# Patient Record
Sex: FEMALE | Race: WHITE | Employment: UNEMPLOYED | URBAN - METROPOLITAN AREA
[De-identification: names, ages, dates, MRNs, and addresses within clinical notes are randomized per-mention and may not be internally consistent; named-entity substitution may affect disease eponyms.]

---

## 2017-01-25 ENCOUNTER — TELEPHONE (OUTPATIENT)
Dept: FAMILY MEDICINE CLINIC | Facility: CLINIC | Age: 57
End: 2017-01-25

## 2017-01-25 NOTE — TELEPHONE ENCOUNTER
Patient will be seeing Derm/ Dr Brenda Guerra she would like recent  lab results faxed to [phone].   Faxed

## 2017-05-17 ENCOUNTER — OFFICE VISIT (OUTPATIENT)
Dept: FAMILY MEDICINE CLINIC | Facility: CLINIC | Age: 57
End: 2017-05-17

## 2017-05-17 VITALS
RESPIRATION RATE: 16 BRPM | HEIGHT: 63.5 IN | DIASTOLIC BLOOD PRESSURE: 74 MMHG | TEMPERATURE: 98 F | HEART RATE: 58 BPM | BODY MASS INDEX: 22.92 KG/M2 | WEIGHT: 131 LBS | OXYGEN SATURATION: 97 % | SYSTOLIC BLOOD PRESSURE: 108 MMHG

## 2017-05-17 DIAGNOSIS — Z00.00 WELL ADULT EXAM: Primary | ICD-10-CM

## 2017-05-17 DIAGNOSIS — Z13.0 SCREENING FOR DEFICIENCY ANEMIA: ICD-10-CM

## 2017-05-17 DIAGNOSIS — Z13.220 SCREENING, LIPID: ICD-10-CM

## 2017-05-17 DIAGNOSIS — Z13.29 SCREENING FOR THYROID DISORDER: ICD-10-CM

## 2017-05-17 DIAGNOSIS — Z13.1 SCREENING FOR DIABETES MELLITUS: ICD-10-CM

## 2017-05-17 DIAGNOSIS — Z12.31 ENCOUNTER FOR SCREENING MAMMOGRAM FOR BREAST CANCER: ICD-10-CM

## 2017-05-17 PROCEDURE — 99396 PREV VISIT EST AGE 40-64: CPT | Performed by: FAMILY MEDICINE

## 2017-05-17 RX ORDER — VALACYCLOVIR HYDROCHLORIDE 1 G/1
TABLET, FILM COATED ORAL
Refills: 7 | COMMUNITY
Start: 2017-04-04 | End: 2017-05-17

## 2017-05-17 RX ORDER — VALACYCLOVIR HYDROCHLORIDE 1 G/1
2 TABLET, FILM COATED ORAL EVERY 12 HOURS SCHEDULED
Qty: 12 TABLET | Refills: 1 | Status: SHIPPED | OUTPATIENT
Start: 2017-05-17 | End: 2021-08-18

## 2017-05-17 NOTE — PROGRESS NOTES
Da Bagley is a 61year old female here for Patient presents with:  Physical: annual Px  last pap  5/13/15-neg HPV-neg      HPI:   Patient complains of here for well exam.     Mild sniffles. Bunions, not painful.      Annual skin checks     Me Dementia Father      Alzheimer's   • Heart Attack Father    • Cancer Mother      basal cell   • Heart Attack Paternal Grandfather    • Diabetes Paternal Grandmother    • Hypertension Maternal Grandmother    • Asthma Son    • Cancer Other      colorectal ca exertion, wheezing, hemoptysis. Cardiovascular: No heart palpitations, irregular heartbeat, faintness or syncope, no chest pressure or chest pain on exertion. No edema or varicose veins.   GI: No change in appetite, heartburn, diarrhea, constipation, or bl reviewed.     [facility] Lab Encounter on 06/02/2016  Glucose Date: 06/02/2016   Value: 86  Ref Range 70-99 mg/dL Status: Final   BUN Date: 06/02/2016   Value: 21* Ref Range 8-20 mg/dL Status: Final   Creatinine Date: 06/02/2016   Value: 0.86  Ref Range 0.55-1.02 35-45 mg/dL  Moderate CHD risk    45-60 mg/dL  Average CHD risk    60-75 md/dL  Below average CHD risk       LDL Cholesterol Date: 06/02/2016   Value: 128  Ref Range <130 mg/dL Status: Final   VLDL Date: 06/02/2016   Value: 17  Ref Range 5-40 mg/dL Status: 06/02/2016   Value: 36.2* Ref Range 37.0-54.0 % Status: Final   PLT Date: 06/02/2016   Value: 350.0  Ref Range 150.0-450.0 10(3)uL Status: Final   MCV Date: 06/02/2016   Value: 97.6  Ref Range 81.0-100.0 fL Status: Final   MCH Date: 06/02/2016   Value: 30. lipid  -     Lipid; Future    Screening for thyroid disorder  -     TSH W Reflex To Free T4; Future    Other orders  -     ValACYclovir HCl 1 G Oral Tab; Take 2 tablets (2,000 mg total) by mouth every 12 (twelve) hours.        Patient Instructions   Conside

## 2017-05-17 NOTE — PATIENT INSTRUCTIONS
Consider Zostavax (shingles vaccine) over age 61. Check with insurance on coverage. Mammogram recommended every 1-2 years after age 36. Pap next year. Labs ordered: lipid profile, comprehensive metabolic profile, CBC, TSH.  We recommend exercise 150 minutes

## 2017-05-19 ENCOUNTER — LAB ENCOUNTER (OUTPATIENT)
Dept: LAB | Age: 57
End: 2017-05-19
Attending: FAMILY MEDICINE
Payer: COMMERCIAL

## 2017-05-19 DIAGNOSIS — Z13.1 SCREENING FOR DIABETES MELLITUS: ICD-10-CM

## 2017-05-19 DIAGNOSIS — Z13.220 SCREENING, LIPID: ICD-10-CM

## 2017-05-19 DIAGNOSIS — Z13.0 SCREENING FOR DEFICIENCY ANEMIA: ICD-10-CM

## 2017-05-19 DIAGNOSIS — Z13.29 SCREENING FOR THYROID DISORDER: ICD-10-CM

## 2017-05-19 PROCEDURE — 36415 COLL VENOUS BLD VENIPUNCTURE: CPT | Performed by: FAMILY MEDICINE

## 2017-05-19 PROCEDURE — 80061 LIPID PANEL: CPT | Performed by: FAMILY MEDICINE

## 2017-05-19 PROCEDURE — 80050 GENERAL HEALTH PANEL: CPT | Performed by: FAMILY MEDICINE

## 2017-05-22 ENCOUNTER — PATIENT MESSAGE (OUTPATIENT)
Dept: FAMILY MEDICINE CLINIC | Facility: CLINIC | Age: 57
End: 2017-05-22

## 2017-05-22 DIAGNOSIS — R53.82 CHRONIC FATIGUE: Primary | ICD-10-CM

## 2017-05-22 DIAGNOSIS — R79.89 ABNORMAL TSH: ICD-10-CM

## 2017-05-22 DIAGNOSIS — R63.5 WEIGHT GAIN: ICD-10-CM

## 2017-05-22 NOTE — TELEPHONE ENCOUNTER
From: Johanny Worley  To: Doreen Mendez MD  Sent: 5/22/2017 2:36 PM CDT  Subject: Test Results Question    I have been having difficulty with slight weight gain that is hard to lose over the past 6 months.  Also increase in fatigue come late aft

## 2017-05-26 ENCOUNTER — APPOINTMENT (OUTPATIENT)
Dept: LAB | Age: 57
End: 2017-05-26
Attending: FAMILY MEDICINE
Payer: COMMERCIAL

## 2017-05-26 DIAGNOSIS — R63.5 WEIGHT GAIN: ICD-10-CM

## 2017-05-26 DIAGNOSIS — R53.82 CHRONIC FATIGUE: ICD-10-CM

## 2017-05-26 DIAGNOSIS — R79.89 ABNORMAL TSH: ICD-10-CM

## 2017-05-26 PROCEDURE — 84481 FREE ASSAY (FT-3): CPT | Performed by: FAMILY MEDICINE

## 2017-05-26 PROCEDURE — 86800 THYROGLOBULIN ANTIBODY: CPT | Performed by: FAMILY MEDICINE

## 2017-05-26 PROCEDURE — 86376 MICROSOMAL ANTIBODY EACH: CPT | Performed by: FAMILY MEDICINE

## 2017-05-26 PROCEDURE — 36415 COLL VENOUS BLD VENIPUNCTURE: CPT | Performed by: FAMILY MEDICINE

## 2017-05-26 PROCEDURE — 84439 ASSAY OF FREE THYROXINE: CPT | Performed by: FAMILY MEDICINE

## 2017-05-26 PROCEDURE — 84443 ASSAY THYROID STIM HORMONE: CPT | Performed by: FAMILY MEDICINE

## 2017-06-14 ENCOUNTER — HOSPITAL ENCOUNTER (OUTPATIENT)
Dept: MAMMOGRAPHY | Age: 57
Discharge: HOME | End: 2017-06-14
Attending: FAMILY MEDICINE
Payer: COMMERCIAL

## 2017-06-14 DIAGNOSIS — Z12.31 ENCOUNTER FOR SCREENING MAMMOGRAM FOR BREAST CANCER: ICD-10-CM

## 2017-06-14 PROCEDURE — 77067 SCR MAMMO BI INCL CAD: CPT | Performed by: FAMILY MEDICINE

## 2018-03-13 ENCOUNTER — HOSPITAL ENCOUNTER (OUTPATIENT)
Dept: CT IMAGING | Facility: HOSPITAL | Age: 58
Discharge: HOME | End: 2018-03-13
Attending: FAMILY MEDICINE

## 2018-03-13 DIAGNOSIS — Z13.9 VISIT FOR SCREENING: ICD-10-CM

## 2018-03-19 ENCOUNTER — PATIENT MESSAGE (OUTPATIENT)
Dept: FAMILY MEDICINE CLINIC | Facility: CLINIC | Age: 58
End: 2018-03-19

## 2018-03-19 NOTE — TELEPHONE ENCOUNTER
Viewed by Keith Nettles on 3/16/2018  1:00 PM   Written by Leslye Kumari MD on 3/13/2018 12:46 PM   In reviewing the CT scan that was done for your heart, the radiologist noted a small 3 mm noncalcified right middle lobe nodule.  There's also a

## 2018-03-19 NOTE — TELEPHONE ENCOUNTER
From: Lor Worley  To: Magan Haynes MD  Sent: 3/19/2018 12:10 PM CDT  Subject: Test Results Question    Should I be worried? I have to admit this is a bit concerning.

## 2018-05-23 ENCOUNTER — OFFICE VISIT (OUTPATIENT)
Dept: FAMILY MEDICINE CLINIC | Facility: CLINIC | Age: 58
End: 2018-05-23

## 2018-05-23 VITALS
BODY MASS INDEX: 23.07 KG/M2 | DIASTOLIC BLOOD PRESSURE: 68 MMHG | SYSTOLIC BLOOD PRESSURE: 110 MMHG | OXYGEN SATURATION: 99 % | HEIGHT: 63.25 IN | WEIGHT: 131.81 LBS | HEART RATE: 68 BPM | TEMPERATURE: 98 F | RESPIRATION RATE: 16 BRPM

## 2018-05-23 DIAGNOSIS — Z13.220 SCREENING, LIPID: ICD-10-CM

## 2018-05-23 DIAGNOSIS — Z13.0 SCREENING FOR DEFICIENCY ANEMIA: Primary | ICD-10-CM

## 2018-05-23 DIAGNOSIS — Z11.59 ENCOUNTER FOR HEPATITIS C SCREENING TEST FOR LOW RISK PATIENT: ICD-10-CM

## 2018-05-23 DIAGNOSIS — Z13.29 SCREENING FOR THYROID DISORDER: ICD-10-CM

## 2018-05-23 DIAGNOSIS — Z12.31 ENCOUNTER FOR SCREENING MAMMOGRAM FOR BREAST CANCER: ICD-10-CM

## 2018-05-23 DIAGNOSIS — Z13.1 SCREENING FOR DIABETES MELLITUS: ICD-10-CM

## 2018-05-23 DIAGNOSIS — Z01.419 WELL WOMAN EXAM WITH ROUTINE GYNECOLOGICAL EXAM: ICD-10-CM

## 2018-05-23 PROCEDURE — 88175 CYTOPATH C/V AUTO FLUID REDO: CPT | Performed by: FAMILY MEDICINE

## 2018-05-23 PROCEDURE — 87624 HPV HI-RISK TYP POOLED RSLT: CPT | Performed by: FAMILY MEDICINE

## 2018-05-23 PROCEDURE — 99396 PREV VISIT EST AGE 40-64: CPT | Performed by: FAMILY MEDICINE

## 2018-05-23 NOTE — PROGRESS NOTES
Sebas Wiggins is a 61year old female here for Patient presents with: Well Adult: Physical w/ pap       HPI:   Patient complains of here for well exam.     Feet are bothersome. Doesn't want bunion surgery yet. golfs, walks.      Taking melatonin es Vitamins-Minerals (MULTIVITAMIN OR) Take  by mouth. Disp:  Rfl:    ValACYclovir HCl 1 G Oral Tab Take 2 tablets (2,000 mg total) by mouth every 12 (twelve) hours.  Disp: 12 tablet Rfl: 1       Allergy:    Pcn [Penicillins]       HIVES, SWELLING    Family Hi unusual bleeding or bruising, no lymph node enlargement or tenderness. Endocrine: No thyroid symptoms. No symptoms of diabetes. Respiratory: No cough, shortness of breath, dyspnea on exertion, wheezing.   Cardiovascular: No heart palpitations, irregular h normal,PAP was done. Bimanual: No cervical motion tenderness. Uterus anteverted, somewhat atrophic. Adnexae nontender, no masses. MUSCULOSKELETAL: Back and extremities within normal limits  EXTREMITIES: no cyanosis, clubbing or edema.  Peripheral pulses n oz hard liquor) daily on average. We also recommend against illegal drug use, or misuse of prescription drugs. Help and information is available for harmful habits or addictions.  Advance directives: Consider obtaining living will or health care power of at

## 2018-05-23 NOTE — PATIENT INSTRUCTIONS
Consider medication or counseling for anxiety/depression/elder care stress. Tdap (tetanus diphtheria pertussis) booster recommended if not received in previous 10 years. Due next year.  Consider shingles vaccine, Shing-stephane 2 shots 2-6 month apart, over a

## 2018-05-24 ENCOUNTER — LAB ENCOUNTER (OUTPATIENT)
Dept: LAB | Age: 58
End: 2018-05-24
Attending: FAMILY MEDICINE
Payer: COMMERCIAL

## 2018-05-24 DIAGNOSIS — Z11.59 ENCOUNTER FOR HEPATITIS C SCREENING TEST FOR LOW RISK PATIENT: ICD-10-CM

## 2018-05-24 DIAGNOSIS — Z13.29 SCREENING FOR THYROID DISORDER: ICD-10-CM

## 2018-05-24 DIAGNOSIS — Z13.220 SCREENING, LIPID: ICD-10-CM

## 2018-05-24 DIAGNOSIS — Z13.1 SCREENING FOR DIABETES MELLITUS: ICD-10-CM

## 2018-05-24 DIAGNOSIS — Z13.0 SCREENING FOR DEFICIENCY ANEMIA: ICD-10-CM

## 2018-05-24 PROCEDURE — 86803 HEPATITIS C AB TEST: CPT | Performed by: FAMILY MEDICINE

## 2018-05-24 PROCEDURE — 80061 LIPID PANEL: CPT | Performed by: FAMILY MEDICINE

## 2018-05-24 PROCEDURE — 36415 COLL VENOUS BLD VENIPUNCTURE: CPT | Performed by: FAMILY MEDICINE

## 2018-05-24 PROCEDURE — 80050 GENERAL HEALTH PANEL: CPT | Performed by: FAMILY MEDICINE

## 2018-07-16 ENCOUNTER — HOSPITAL ENCOUNTER (OUTPATIENT)
Dept: MAMMOGRAPHY | Age: 58
Discharge: HOME | End: 2018-07-16
Attending: FAMILY MEDICINE
Payer: COMMERCIAL

## 2018-07-16 DIAGNOSIS — Z12.31 ENCOUNTER FOR SCREENING MAMMOGRAM FOR BREAST CANCER: ICD-10-CM

## 2018-07-16 PROCEDURE — 77063 BREAST TOMOSYNTHESIS BI: CPT | Performed by: FAMILY MEDICINE

## 2018-07-16 PROCEDURE — 77067 SCR MAMMO BI INCL CAD: CPT | Performed by: FAMILY MEDICINE

## 2019-05-28 ENCOUNTER — OFFICE VISIT (OUTPATIENT)
Dept: FAMILY MEDICINE CLINIC | Facility: CLINIC | Age: 59
End: 2019-05-28
Payer: COMMERCIAL

## 2019-05-28 VITALS
HEIGHT: 63.25 IN | SYSTOLIC BLOOD PRESSURE: 102 MMHG | BODY MASS INDEX: 22.92 KG/M2 | TEMPERATURE: 99 F | DIASTOLIC BLOOD PRESSURE: 76 MMHG | HEART RATE: 101 BPM | OXYGEN SATURATION: 98 % | WEIGHT: 131 LBS | RESPIRATION RATE: 18 BRPM

## 2019-05-28 DIAGNOSIS — Z00.00 WELL ADULT EXAM: ICD-10-CM

## 2019-05-28 DIAGNOSIS — Z13.0 SCREENING FOR DEFICIENCY ANEMIA: ICD-10-CM

## 2019-05-28 DIAGNOSIS — Z13.220 SCREENING, LIPID: ICD-10-CM

## 2019-05-28 DIAGNOSIS — Z13.1 SCREENING FOR DIABETES MELLITUS: ICD-10-CM

## 2019-05-28 DIAGNOSIS — Z12.31 ENCOUNTER FOR SCREENING MAMMOGRAM FOR BREAST CANCER: Primary | ICD-10-CM

## 2019-05-28 PROCEDURE — 99396 PREV VISIT EST AGE 40-64: CPT | Performed by: FAMILY MEDICINE

## 2019-05-28 NOTE — PATIENT INSTRUCTIONS
Shingrix when able to get at pharmacy. Mammogram in July and labs. Colonoscopy and pap in 2021. Otherwise up to date on screening. BMI is normal, don't need to lose weight.      Recheck in a year on physical.    Schedule for Tdap when you are ab

## 2019-05-28 NOTE — PROGRESS NOTES
Avni Patton is a 60year old female here for Patient presents with:  Physical      HPI:   Patient complains of here for well exam.     Generally feels well. Mother had CVA last year, plans hospice.  Pt has HCPOA, in senior community in memory (2,000 mg total) by mouth every 12 (twelve) hours.  Disp: 12 tablet Rfl: 1       Allergy:    Pcn [Penicillins]       HIVES, SWELLING    Family History   Problem Relation Age of Onset   • Dementia Father         Alzheimer's   • Heart Attack Father    • Neuro abused: Not on file        Forced sexual activity: Not on file    Other Topics      Concerns:         Service: Not Asked        Blood Transfusions: No        Caffeine Concern: Yes          2 c coffee/day        Occupational Exposure: No        Hobb tenderness. :defer  MUSCULOSKELETAL: Back and extremities within normal limits  EXTREMITIES: no cyanosis, clubbing or edema.  Peripheral pulses normal.  NEURO: Nonfocal exam. Oriented times three,cranial nerves are intact,motor and sensory are grossly in Average CHD risk    60-75 md/dL  Below average CHD risk       • LDL Cholesterol 05/24/2018 146* <130 mg/dL Final   • VLDL 05/24/2018 18  5 - 40 mg/dL Final   • Chol/HDL Ratio 05/24/2018 4.64* <4.44 Final      Interpretation of CHOL/HDL ratios:      Male: 05/24/2018 5.9  % Final   • Eosinophil % 05/24/2018 2.5  % Final   • Basophil % 05/24/2018 0.4  % Final   • Immature Granulocyte % 05/24/2018 0.2  % Final           ASSESSMENT AND PLAN:   [employer] was seen today for physical.    Diagnoses and all orders for t

## 2019-06-27 ENCOUNTER — LAB ENCOUNTER (OUTPATIENT)
Dept: LAB | Age: 59
End: 2019-06-27
Attending: FAMILY MEDICINE
Payer: COMMERCIAL

## 2019-06-27 DIAGNOSIS — Z13.0 SCREENING FOR DEFICIENCY ANEMIA: ICD-10-CM

## 2019-06-27 DIAGNOSIS — Z13.220 SCREENING, LIPID: ICD-10-CM

## 2019-06-27 DIAGNOSIS — Z13.1 SCREENING FOR DIABETES MELLITUS: ICD-10-CM

## 2019-06-27 PROCEDURE — 80061 LIPID PANEL: CPT | Performed by: FAMILY MEDICINE

## 2019-06-27 PROCEDURE — 36415 COLL VENOUS BLD VENIPUNCTURE: CPT | Performed by: FAMILY MEDICINE

## 2019-06-27 PROCEDURE — 83036 HEMOGLOBIN GLYCOSYLATED A1C: CPT | Performed by: FAMILY MEDICINE

## 2019-06-27 PROCEDURE — 85025 COMPLETE CBC W/AUTO DIFF WBC: CPT | Performed by: FAMILY MEDICINE

## 2019-06-27 PROCEDURE — 80053 COMPREHEN METABOLIC PANEL: CPT | Performed by: FAMILY MEDICINE

## 2019-07-17 ENCOUNTER — NURSE ONLY (OUTPATIENT)
Dept: FAMILY MEDICINE CLINIC | Facility: CLINIC | Age: 59
End: 2019-07-17
Payer: COMMERCIAL

## 2019-07-17 PROCEDURE — 90471 IMMUNIZATION ADMIN: CPT | Performed by: FAMILY MEDICINE

## 2019-07-17 PROCEDURE — 90715 TDAP VACCINE 7 YRS/> IM: CPT | Performed by: FAMILY MEDICINE

## 2019-07-19 ENCOUNTER — HOSPITAL ENCOUNTER (OUTPATIENT)
Dept: MAMMOGRAPHY | Age: 59
Discharge: HOME | End: 2019-07-19
Attending: FAMILY MEDICINE
Payer: COMMERCIAL

## 2019-07-19 DIAGNOSIS — Z12.31 ENCOUNTER FOR SCREENING MAMMOGRAM FOR BREAST CANCER: ICD-10-CM

## 2019-07-19 PROCEDURE — 77067 SCR MAMMO BI INCL CAD: CPT | Performed by: FAMILY MEDICINE

## 2019-07-19 PROCEDURE — 77063 BREAST TOMOSYNTHESIS BI: CPT | Performed by: FAMILY MEDICINE

## 2019-12-06 ENCOUNTER — PATIENT MESSAGE (OUTPATIENT)
Dept: FAMILY MEDICINE CLINIC | Facility: CLINIC | Age: 59
End: 2019-12-06

## 2019-12-06 NOTE — TELEPHONE ENCOUNTER
From: Leo Worley  To: Sarabjit Kaur MD  Sent: 12/6/2019 10:05 AM CST  Subject: Non-Urgent Medical Question    Dr. Tristin Rodríguez - I currently am not taking a baby aspirin. Should I begin taking one?

## 2020-06-11 ENCOUNTER — OFFICE VISIT (OUTPATIENT)
Dept: FAMILY MEDICINE CLINIC | Facility: CLINIC | Age: 60
End: 2020-06-11
Payer: COMMERCIAL

## 2020-06-11 VITALS
DIASTOLIC BLOOD PRESSURE: 62 MMHG | OXYGEN SATURATION: 98 % | BODY MASS INDEX: 22.49 KG/M2 | RESPIRATION RATE: 16 BRPM | TEMPERATURE: 99 F | WEIGHT: 128.5 LBS | HEART RATE: 66 BPM | HEIGHT: 63.25 IN | SYSTOLIC BLOOD PRESSURE: 106 MMHG

## 2020-06-11 DIAGNOSIS — Z13.220 SCREENING, LIPID: ICD-10-CM

## 2020-06-11 DIAGNOSIS — Z13.29 SCREENING FOR THYROID DISORDER: ICD-10-CM

## 2020-06-11 DIAGNOSIS — Z00.00 WELL ADULT EXAM: Primary | ICD-10-CM

## 2020-06-11 DIAGNOSIS — Z12.11 ENCOUNTER FOR COLORECTAL CANCER SCREENING: ICD-10-CM

## 2020-06-11 DIAGNOSIS — Z12.31 ENCOUNTER FOR SCREENING MAMMOGRAM FOR BREAST CANCER: ICD-10-CM

## 2020-06-11 DIAGNOSIS — Z13.1 SCREENING FOR DIABETES MELLITUS: ICD-10-CM

## 2020-06-11 DIAGNOSIS — Z12.12 ENCOUNTER FOR COLORECTAL CANCER SCREENING: ICD-10-CM

## 2020-06-11 DIAGNOSIS — Z13.0 SCREENING FOR DEFICIENCY ANEMIA: ICD-10-CM

## 2020-06-11 PROCEDURE — 99396 PREV VISIT EST AGE 40-64: CPT | Performed by: FAMILY MEDICINE

## 2020-06-11 NOTE — PROGRESS NOTES
Avni Patton is a 60year old female here for Patient presents with:  Physical      HPI:   Patient complains of here for well exam.     Feels well physically, active.  Golf, bike, walk etc. No complaints; a little worry about current situation with basal cell   • Hypertension Mother    • Heart Attack Paternal Grandfather    • Heart Disease Paternal Grandfather         CAD   • Diabetes Paternal Grandmother    • Hypertension Maternal Grandmother    • Asthma Son    • Cancer Other         colorectal ca, No        Stress Concern: No        Weight Concern: Yes          doesn't eat much         Special Diet: Yes          healthy, fish, chicken, veggies        Back Care: No        Exercise:  Yes          regular workouts        Bike Helmet: Yes        Seat Bel - 145 mmol/L Final   • Potassium 06/27/2019 5.0  3.5 - 5.1 mmol/L Final   • Chloride 06/27/2019 111  98 - 112 mmol/L Final   • CO2 06/27/2019 25.0  21.0 - 32.0 mmol/L Final   • Anion Gap 06/27/2019 8  0 - 18 mmol/L Final   • BUN 06/27/2019 17  7 - 18 mg/dL mg/dL       • HgbA1C 06/27/2019 5.9* <5.7 % Final       Normal HbA1C:     <5.7%      Pre-Diabetic:     5.7 - 6.4%      Diabetic:         >6.4%      Diabetic Control: <7.0%       • Estimated Average Glucose 06/27/2019 123  68 - 126 mg/dL Final      eAG is t screening  -     GASTRO - INTERNAL    Screening for deficiency anemia  -     CBC WITH DIFFERENTIAL WITH PLATELET; Future    Screening for diabetes mellitus  -     COMP METABOLIC PANEL (14);  Future  -     HEMOGLOBIN A1C; Future    Screening, lipid  -     LI available for harmful habits or addictions. Advance directives: Consider obtaining living will or health care power of , information available at http://www. [place]ph.state. [place]/public/books/advin.htm.      Health prevention and screening recommendation cancer screening with mammograms.       Cervical cancer All women in this age group, except women who have had a complete hysterectomy Pap test every 3 years or Pap test with human papillomavirus (HPV) test every 5 years   Chlamydia Women at increased risk apart   Hepatitis B Women at increased risk for infection – talk with your healthcare provider 3 doses over 6 months; second dose should be given 1 month after the first dose; the third dose should be given at least 2 months after the second dose and at [place] When your risk is known   Use of tobacco and the health effects it can cause All women in this age group Every exam   [address]  Date Last Reviewed: 1/26/2016  © 0952-7863 [employer]. [address] [facility], [address] [place] [address].

## 2020-06-11 NOTE — PATIENT INSTRUCTIONS
Tdap (tetanus diphtheria pertussis) booster recommended if not received in previous 10 years. Health screening: Mammogram recommended every 1-2 years after age 36.  If you still have your cervix (if your uterus was not removed), Pap recommended every 3 years The goal is to find a disease early so lifestyle changes can be made and you can be watched more closely to reduce the risk of disease, or to detect it early enough to treat it most effectively.  Screening tests are not considered diagnostic, but are used t fecal immunochemical test; or a stool DNA test as often as your health care provider advises; talk with your health care provider about which tests are best for you   Depression All women in this age group At routine exams   Gonorrhea Sexually active women mumps, rubella (MMR) Women in this age group through their late 46s who have no record of these infections or vaccines 1 dose   Meningococcal Women at increased risk for infection – talk with your healthcare provider 1 or more doses   Pneumococcal conjugat

## 2020-06-17 ENCOUNTER — TELEPHONE (OUTPATIENT)
Dept: FAMILY MEDICINE CLINIC | Facility: CLINIC | Age: 60
End: 2020-06-17

## 2020-06-17 NOTE — TELEPHONE ENCOUNTER
Patient requested appointment times for a colonoscopy. Dr. Rosalva Cm had   referred to GI as below to schedule with them.        [address]i [address] An [phone]     Called patient and Lvm stating this and to

## 2020-06-18 ENCOUNTER — LAB ENCOUNTER (OUTPATIENT)
Dept: LAB | Age: 60
End: 2020-06-18
Attending: FAMILY MEDICINE
Payer: COMMERCIAL

## 2020-06-18 DIAGNOSIS — Z13.220 SCREENING, LIPID: ICD-10-CM

## 2020-06-18 DIAGNOSIS — Z13.1 SCREENING FOR DIABETES MELLITUS: ICD-10-CM

## 2020-06-18 DIAGNOSIS — Z13.0 SCREENING FOR DEFICIENCY ANEMIA: ICD-10-CM

## 2020-06-18 DIAGNOSIS — Z13.29 SCREENING FOR THYROID DISORDER: ICD-10-CM

## 2020-06-18 PROCEDURE — 80061 LIPID PANEL: CPT | Performed by: FAMILY MEDICINE

## 2020-06-18 PROCEDURE — 80050 GENERAL HEALTH PANEL: CPT | Performed by: FAMILY MEDICINE

## 2020-06-18 PROCEDURE — 83036 HEMOGLOBIN GLYCOSYLATED A1C: CPT | Performed by: FAMILY MEDICINE

## 2020-07-30 ENCOUNTER — HOSPITAL ENCOUNTER (OUTPATIENT)
Dept: MAMMOGRAPHY | Age: 60
Discharge: HOME | End: 2020-07-30
Attending: FAMILY MEDICINE
Payer: COMMERCIAL

## 2020-07-30 DIAGNOSIS — Z12.31 ENCOUNTER FOR SCREENING MAMMOGRAM FOR BREAST CANCER: ICD-10-CM

## 2020-07-30 PROCEDURE — 77067 SCR MAMMO BI INCL CAD: CPT | Performed by: FAMILY MEDICINE

## 2020-07-30 PROCEDURE — 77063 BREAST TOMOSYNTHESIS BI: CPT | Performed by: FAMILY MEDICINE

## 2020-09-14 ENCOUNTER — APPOINTMENT (OUTPATIENT)
Dept: LAB | Age: 60
End: 2020-09-14
Attending: INTERNAL MEDICINE
Payer: COMMERCIAL

## 2020-09-14 DIAGNOSIS — Z01.818 PRE-OP TESTING: ICD-10-CM

## 2020-09-17 LAB — SARS-COV-2 RNA RESP QL NAA+PROBE: NOT DETECTED

## 2021-07-08 ENCOUNTER — TELEPHONE (OUTPATIENT)
Dept: FAMILY MEDICINE CLINIC | Facility: CLINIC | Age: 61
End: 2021-07-08

## 2021-07-08 NOTE — TELEPHONE ENCOUNTER
Patient called back to r/s her appt with Dr. Tamara Burns. She would like to know if orders can be placed for labs so she can have done prior to 8/20 visit. Patient would like to be notified either way.

## 2021-07-30 ENCOUNTER — TELEPHONE (OUTPATIENT)
Dept: FAMILY MEDICINE CLINIC | Facility: CLINIC | Age: 61
End: 2021-07-30

## 2021-07-30 DIAGNOSIS — Z00.00 WELL ADULT EXAM: Primary | ICD-10-CM

## 2021-07-30 NOTE — TELEPHONE ENCOUNTER
Patient rescheduled physical for 8/18 with APRN. She would like to know if orders for labs can be placed prior to her physical appointment. Patient would like to be notified when orders are placed.

## 2021-08-05 ENCOUNTER — TELEPHONE (OUTPATIENT)
Dept: FAMILY MEDICINE CLINIC | Facility: CLINIC | Age: 61
End: 2021-08-05

## 2021-08-05 DIAGNOSIS — Z12.31 ENCOUNTER FOR SCREENING MAMMOGRAM FOR MALIGNANT NEOPLASM OF BREAST: Primary | ICD-10-CM

## 2021-08-05 NOTE — TELEPHONE ENCOUNTER
Last mammo 7/30/20  Impression   CONCLUSION:     DIAGNOSTIC CATEGORY 1--NEGATIVE ASSESSMENT.         RECOMMENDATIONS:     ROUTINE MAMMOGRAM AND CLINICAL EVALUATION IN 12 MONTHS.      Future Appointments   Date Time Provider Adrien Wells   8/18/2021  2:

## 2021-08-16 ENCOUNTER — LAB ENCOUNTER (OUTPATIENT)
Dept: LAB | Age: 61
End: 2021-08-16
Attending: NURSE PRACTITIONER
Payer: COMMERCIAL

## 2021-08-16 DIAGNOSIS — Z00.00 WELL ADULT EXAM: ICD-10-CM

## 2021-08-16 LAB
ALBUMIN SERPL-MCNC: 3.6 G/DL (ref 3.4–5)
ALBUMIN/GLOB SERPL: 0.9 {RATIO} (ref 1–2)
ALP LIVER SERPL-CCNC: 58 U/L
ALT SERPL-CCNC: 22 U/L
ANION GAP SERPL CALC-SCNC: 1 MMOL/L (ref 0–18)
AST SERPL-CCNC: 19 U/L (ref 15–37)
BASOPHILS # BLD AUTO: 0.01 X10(3) UL (ref 0–0.2)
BASOPHILS NFR BLD AUTO: 0.1 %
BILIRUB SERPL-MCNC: 0.2 MG/DL (ref 0.1–2)
BUN BLD-MCNC: 15 MG/DL (ref 7–18)
CALCIUM BLD-MCNC: 8.9 MG/DL (ref 8.5–10.1)
CHLORIDE SERPL-SCNC: 113 MMOL/L (ref 98–112)
CHOLEST SMN-MCNC: 234 MG/DL (ref ?–200)
CO2 SERPL-SCNC: 28 MMOL/L (ref 21–32)
CREAT BLD-MCNC: 0.87 MG/DL
EOSINOPHIL # BLD AUTO: 0.13 X10(3) UL (ref 0–0.7)
EOSINOPHIL NFR BLD AUTO: 1.7 %
ERYTHROCYTE [DISTWIDTH] IN BLOOD BY AUTOMATED COUNT: 13.3 %
EST. AVERAGE GLUCOSE BLD GHB EST-MCNC: 128 MG/DL (ref 68–126)
GLOBULIN PLAS-MCNC: 3.8 G/DL (ref 2.8–4.4)
GLUCOSE BLD-MCNC: 96 MG/DL (ref 70–99)
HBA1C MFR BLD HPLC: 6.1 % (ref ?–5.7)
HCT VFR BLD AUTO: 40 %
HDLC SERPL-MCNC: 49 MG/DL (ref 40–59)
HGB BLD-MCNC: 12.5 G/DL
IMM GRANULOCYTES # BLD AUTO: 0.02 X10(3) UL (ref 0–1)
IMM GRANULOCYTES NFR BLD: 0.3 %
LDLC SERPL CALC-MCNC: 170 MG/DL (ref ?–100)
LYMPHOCYTES # BLD AUTO: 2.18 X10(3) UL (ref 1–4)
LYMPHOCYTES NFR BLD AUTO: 29 %
M PROTEIN MFR SERPL ELPH: 7.4 G/DL (ref 6.4–8.2)
MCH RBC QN AUTO: 31.2 PG (ref 26–34)
MCHC RBC AUTO-ENTMCNC: 31.3 G/DL (ref 31–37)
MCV RBC AUTO: 99.8 FL
MONOCYTES # BLD AUTO: 0.37 X10(3) UL (ref 0.1–1)
MONOCYTES NFR BLD AUTO: 4.9 %
NEUTROPHILS # BLD AUTO: 4.8 X10 (3) UL (ref 1.5–7.7)
NEUTROPHILS # BLD AUTO: 4.8 X10(3) UL (ref 1.5–7.7)
NEUTROPHILS NFR BLD AUTO: 64 %
NONHDLC SERPL-MCNC: 185 MG/DL (ref ?–130)
OSMOLALITY SERPL CALC.SUM OF ELEC: 295 MOSM/KG (ref 275–295)
PATIENT FASTING Y/N/NP: YES
PATIENT FASTING Y/N/NP: YES
PLATELET # BLD AUTO: 313 10(3)UL (ref 150–450)
POTASSIUM SERPL-SCNC: 5 MMOL/L (ref 3.5–5.1)
RBC # BLD AUTO: 4.01 X10(6)UL
SODIUM SERPL-SCNC: 142 MMOL/L (ref 136–145)
TRIGL SERPL-MCNC: 85 MG/DL (ref 30–149)
TSI SER-ACNC: 2.7 MIU/ML (ref 0.36–3.74)
VLDLC SERPL CALC-MCNC: 17 MG/DL (ref 0–30)
WBC # BLD AUTO: 7.5 X10(3) UL (ref 4–11)

## 2021-08-16 PROCEDURE — 80050 GENERAL HEALTH PANEL: CPT | Performed by: NURSE PRACTITIONER

## 2021-08-16 PROCEDURE — 83036 HEMOGLOBIN GLYCOSYLATED A1C: CPT | Performed by: NURSE PRACTITIONER

## 2021-08-16 PROCEDURE — 80061 LIPID PANEL: CPT | Performed by: NURSE PRACTITIONER

## 2021-08-18 ENCOUNTER — OFFICE VISIT (OUTPATIENT)
Dept: FAMILY MEDICINE CLINIC | Facility: CLINIC | Age: 61
End: 2021-08-18
Payer: COMMERCIAL

## 2021-08-18 VITALS
RESPIRATION RATE: 16 BRPM | DIASTOLIC BLOOD PRESSURE: 62 MMHG | WEIGHT: 132 LBS | HEART RATE: 82 BPM | TEMPERATURE: 97 F | HEIGHT: 62 IN | BODY MASS INDEX: 24.29 KG/M2 | SYSTOLIC BLOOD PRESSURE: 102 MMHG | OXYGEN SATURATION: 98 %

## 2021-08-18 DIAGNOSIS — Z12.4 ENCOUNTER FOR PAPANICOLAOU SMEAR FOR CERVICAL CANCER SCREENING: ICD-10-CM

## 2021-08-18 DIAGNOSIS — Z00.00 ROUTINE GENERAL MEDICAL EXAMINATION AT A HEALTH CARE FACILITY: Primary | ICD-10-CM

## 2021-08-18 PROCEDURE — 3008F BODY MASS INDEX DOCD: CPT | Performed by: NURSE PRACTITIONER

## 2021-08-18 PROCEDURE — 88175 CYTOPATH C/V AUTO FLUID REDO: CPT | Performed by: NURSE PRACTITIONER

## 2021-08-18 PROCEDURE — 99396 PREV VISIT EST AGE 40-64: CPT | Performed by: NURSE PRACTITIONER

## 2021-08-18 PROCEDURE — 3074F SYST BP LT 130 MM HG: CPT | Performed by: NURSE PRACTITIONER

## 2021-08-18 PROCEDURE — 3078F DIAST BP <80 MM HG: CPT | Performed by: NURSE PRACTITIONER

## 2021-08-18 PROCEDURE — 87624 HPV HI-RISK TYP POOLED RSLT: CPT | Performed by: NURSE PRACTITIONER

## 2021-08-18 RX ORDER — FLUOROURACIL 50 MG/G
1 CREAM TOPICAL DAILY
COMMUNITY
Start: 2021-05-13

## 2021-08-19 LAB — HPV I/H RISK 1 DNA SPEC QL NAA+PROBE: NEGATIVE

## 2021-08-24 ENCOUNTER — PATIENT MESSAGE (OUTPATIENT)
Dept: FAMILY MEDICINE CLINIC | Facility: CLINIC | Age: 61
End: 2021-08-24

## 2021-08-24 DIAGNOSIS — E78.00 PURE HYPERCHOLESTEROLEMIA: Primary | ICD-10-CM

## 2021-08-25 RX ORDER — ROSUVASTATIN CALCIUM 10 MG/1
10 TABLET, COATED ORAL NIGHTLY
Qty: 90 TABLET | Refills: 0 | Status: SHIPPED | OUTPATIENT
Start: 2021-08-25 | End: 2021-11-18

## 2021-08-25 NOTE — TELEPHONE ENCOUNTER
Per PCP OV notes 8/18/21: Routine general medical examination at a health care facility  Reviewed lab results, continue to work on diet to reduce A1C and cholesterol  Mammogram scheduled   C-scope UTD    Please advise. Thank you.

## 2021-08-25 NOTE — TELEPHONE ENCOUNTER
From: Marlene Worley  To: AMY Wilson  Sent: 8/24/2021 6:58 PM CDT  Subject: Non-Urgent Medical Question    Gopi Umana,  Thank you for the results. One additional question…. do you think I should start a statin due to the consistent elevated c

## 2021-08-26 ENCOUNTER — HOSPITAL ENCOUNTER (OUTPATIENT)
Dept: MAMMOGRAPHY | Age: 61
Discharge: HOME | End: 2021-08-26
Attending: NURSE PRACTITIONER
Payer: COMMERCIAL

## 2021-08-26 DIAGNOSIS — Z12.31 ENCOUNTER FOR SCREENING MAMMOGRAM FOR MALIGNANT NEOPLASM OF BREAST: ICD-10-CM

## 2021-08-26 PROCEDURE — 77067 SCR MAMMO BI INCL CAD: CPT | Performed by: NURSE PRACTITIONER

## 2021-08-26 PROCEDURE — 77063 BREAST TOMOSYNTHESIS BI: CPT | Performed by: NURSE PRACTITIONER

## 2021-11-18 RX ORDER — ROSUVASTATIN CALCIUM 10 MG/1
TABLET, COATED ORAL
Qty: 90 TABLET | Refills: 0 | Status: SHIPPED | OUTPATIENT
Start: 2021-11-18 | End: 2022-01-26

## 2021-11-18 NOTE — TELEPHONE ENCOUNTER
Cholesterol Medication Protocol Passed 11/18/2021 01:55 PM   Protocol Details  ALT < 80    ALT resulted within past year    Lipid panel within past 12 months    Appointment within past 12 or next 3 months     LOV 8/18/21     LAST LAB   8/16/21     LAST RX

## 2021-11-23 ENCOUNTER — LAB ENCOUNTER (OUTPATIENT)
Dept: LAB | Age: 61
End: 2021-11-23
Attending: NURSE PRACTITIONER
Payer: COMMERCIAL

## 2021-11-23 DIAGNOSIS — E78.00 PURE HYPERCHOLESTEROLEMIA: ICD-10-CM

## 2021-11-23 PROCEDURE — 80061 LIPID PANEL: CPT | Performed by: NURSE PRACTITIONER

## 2021-11-29 ENCOUNTER — OFFICE VISIT (OUTPATIENT)
Dept: FAMILY MEDICINE CLINIC | Facility: CLINIC | Age: 61
End: 2021-11-29
Payer: COMMERCIAL

## 2021-11-29 VITALS
DIASTOLIC BLOOD PRESSURE: 62 MMHG | RESPIRATION RATE: 16 BRPM | WEIGHT: 137 LBS | HEART RATE: 76 BPM | BODY MASS INDEX: 25.21 KG/M2 | HEIGHT: 62 IN | SYSTOLIC BLOOD PRESSURE: 120 MMHG | TEMPERATURE: 98 F

## 2021-11-29 DIAGNOSIS — E78.00 PURE HYPERCHOLESTEROLEMIA: Primary | ICD-10-CM

## 2021-11-29 DIAGNOSIS — Z51.81 ENCOUNTER FOR MEDICATION MONITORING: ICD-10-CM

## 2021-11-29 DIAGNOSIS — G89.29 CHRONIC RIGHT HIP PAIN: ICD-10-CM

## 2021-11-29 DIAGNOSIS — M25.551 CHRONIC RIGHT HIP PAIN: ICD-10-CM

## 2021-11-29 PROCEDURE — 3074F SYST BP LT 130 MM HG: CPT | Performed by: STUDENT IN AN ORGANIZED HEALTH CARE EDUCATION/TRAINING PROGRAM

## 2021-11-29 PROCEDURE — 99213 OFFICE O/P EST LOW 20 MIN: CPT | Performed by: STUDENT IN AN ORGANIZED HEALTH CARE EDUCATION/TRAINING PROGRAM

## 2021-11-29 PROCEDURE — 3008F BODY MASS INDEX DOCD: CPT | Performed by: STUDENT IN AN ORGANIZED HEALTH CARE EDUCATION/TRAINING PROGRAM

## 2021-11-29 PROCEDURE — 3078F DIAST BP <80 MM HG: CPT | Performed by: STUDENT IN AN ORGANIZED HEALTH CARE EDUCATION/TRAINING PROGRAM

## 2021-11-29 NOTE — PROGRESS NOTES
[facility] Note    Chief complaint: Patient presents with:  Medication Follow-Up    HPI:   Patient is a 57year old female with a PMH of  has a past medical history of Actinic keratoses, Acute frontal sinusitis, Acute maxillary si examination at a health care facility    • Routine gynecological examination    • Screening for diabetes mellitus    • Screening for lipoid disorders    • Screening for malignant neoplasm of the rectum    • Screening for other and unspecified deficiency an Estimated body mass index is 25.06 kg/m² as calculated from the following:    Height as of this encounter: 5' 2\" (1.575 m). Weight as of this encounter: 137 lb (62.1 kg). Vital signs reviewed. Appears stated age, well groomed.   Physical Exam:  GEN: for annual physical.    Noah Jones MD  [address] [facility]  11/29/21

## 2021-11-29 NOTE — PATIENT INSTRUCTIONS
High Cholesterol: Assessing Your Risk  High cholesterol level is one of the big risk factor for heart disease and heart attack. It's also a risk factor for stroke and peripheral artery disease.  Assessing your risk is a first step in preventing these dise have high cholesterol? Do you take medicine to control your cholesterol? Have your LDL-C (\"bad\") cholesterol and triglyceride levels stayed high over time even with a healthy diet and exercise? · Smoking.  Do you smoke or use tobacco products such as melonie affect your treatment decisions.  The following things can affect your risk:  · A family have a history of heart problems before the age of 54 in male relatives or age 72 in female relatives  · Primary high cholesterol  · Metabolic syndrome  · Chronic kidne LDL cholesterol results. The optimal level of LDL has changed over time and depends on your risk factors. Generally an LDL cholesterol level around 100 mg/dL may be a good goal. But talk with your provider about what level is best for you.  It's important blood.  Instructions  Swallow the medicine without crushing or chewing it. This medicine may be taken with or without food. Keep the medicine at room temperature. Avoid heat and direct light. Do not take this medicine with antacids.   It is important ad without first speaking to your doctor or pharmacist.  Do not share this medicine with anyone who has not been prescribed this medicine.   Side Effects  Call your doctor or get medical help right away if you notice any of these more serious side effects:  ·

## 2022-01-01 ENCOUNTER — PATIENT MESSAGE (OUTPATIENT)
Dept: FAMILY MEDICINE CLINIC | Facility: CLINIC | Age: 62
End: 2022-01-01

## 2022-01-03 NOTE — TELEPHONE ENCOUNTER
From: Kira Worley  To: Erica Kaiser MD  Sent: 1/1/2022 3:45 PM CST  Subject: Vaccination record    I am updating my medical records and can’t find the dates of my shingle vaccination. Do you have them recorded?

## 2022-01-27 DIAGNOSIS — E78.00 PURE HYPERCHOLESTEROLEMIA: Primary | ICD-10-CM

## 2022-01-27 RX ORDER — ROSUVASTATIN CALCIUM 10 MG/1
10 TABLET, COATED ORAL NIGHTLY
Qty: 90 TABLET | Refills: 2 | Status: SHIPPED | OUTPATIENT
Start: 2022-01-27 | End: 2022-01-28

## 2022-01-27 NOTE — TELEPHONE ENCOUNTER
LOV: 11/29/21    NOV: none scheduled    LF: 11/18/21, #90, ref 0    Medication passed protocol. Routed to  for confirmation, as this is new pt.   (Called pt, LVM to confirm she would like PCP updated to )

## 2022-01-28 RX ORDER — ROSUVASTATIN CALCIUM 10 MG/1
10 TABLET, COATED ORAL NIGHTLY
Qty: 90 TABLET | Refills: 2 | Status: SHIPPED | OUTPATIENT
Start: 2022-01-28

## 2022-06-22 ENCOUNTER — OFFICE VISIT (OUTPATIENT)
Dept: FAMILY MEDICINE CLINIC | Facility: CLINIC | Age: 62
End: 2022-06-22
Payer: COMMERCIAL

## 2022-06-22 VITALS — BODY MASS INDEX: 22.88 KG/M2 | RESPIRATION RATE: 18 BRPM | TEMPERATURE: 98 F | HEIGHT: 64 IN | WEIGHT: 134 LBS

## 2022-06-22 DIAGNOSIS — B00.9 HSV INFECTION: ICD-10-CM

## 2022-06-22 DIAGNOSIS — E78.00 PURE HYPERCHOLESTEROLEMIA: ICD-10-CM

## 2022-06-22 DIAGNOSIS — Z12.31 ENCOUNTER FOR SCREENING MAMMOGRAM FOR MALIGNANT NEOPLASM OF BREAST: ICD-10-CM

## 2022-06-22 DIAGNOSIS — Z00.00 WELLNESS EXAMINATION: Primary | ICD-10-CM

## 2022-06-22 DIAGNOSIS — R73.03 PREDIABETES: ICD-10-CM

## 2022-06-22 PROCEDURE — 3008F BODY MASS INDEX DOCD: CPT | Performed by: STUDENT IN AN ORGANIZED HEALTH CARE EDUCATION/TRAINING PROGRAM

## 2022-06-22 PROCEDURE — 99396 PREV VISIT EST AGE 40-64: CPT | Performed by: STUDENT IN AN ORGANIZED HEALTH CARE EDUCATION/TRAINING PROGRAM

## 2022-06-22 RX ORDER — VALACYCLOVIR HYDROCHLORIDE 1 G/1
2000 TABLET, FILM COATED ORAL EVERY 12 HOURS SCHEDULED
Qty: 12 TABLET | Refills: 1 | Status: SHIPPED | OUTPATIENT
Start: 2022-06-22 | End: 2022-06-23

## 2022-06-22 RX ORDER — ROSUVASTATIN CALCIUM 5 MG/1
5 TABLET, COATED ORAL NIGHTLY
Qty: 90 TABLET | Refills: 1 | Status: SHIPPED | OUTPATIENT
Start: 2022-06-22

## 2022-08-17 ENCOUNTER — PATIENT MESSAGE (OUTPATIENT)
Dept: FAMILY MEDICINE CLINIC | Facility: CLINIC | Age: 62
End: 2022-08-17

## 2022-08-17 NOTE — TELEPHONE ENCOUNTER
Please see the pt question below. Please advise      As far as her Tdap, she is up to date. To: [facility]      From: Alexsandra Worley      Created: 8/17/2022 10:47 AM        *-*-*This message has not been handled. *-*-*    I will be traveling to [facility] in October. Do you think Hepatitis A vaccines are needed? Am I up to date on tetanus?

## 2022-08-17 NOTE — TELEPHONE ENCOUNTER
From: Brock Worley  To: Bernard Espitia MD  Sent: 8/17/2022 10:47 AM CDT  Subject: Immunizations    I will be traveling to [facility] in October. Do you think Hepatitis A vaccines are needed? Am I up to date on tetanus?

## 2022-08-18 NOTE — TELEPHONE ENCOUNTER
Have patient log onto the [facility] website and check the information for travelers - this will give vaccine recommendations.   Could also have her set up an appointment with Dr. Luca Pickett to discuss recommendations for travel

## 2022-08-29 ENCOUNTER — HOSPITAL ENCOUNTER (OUTPATIENT)
Dept: MAMMOGRAPHY | Age: 62
Discharge: HOME | End: 2022-08-29
Attending: STUDENT IN AN ORGANIZED HEALTH CARE EDUCATION/TRAINING PROGRAM
Payer: COMMERCIAL

## 2022-08-29 DIAGNOSIS — Z12.31 ENCOUNTER FOR SCREENING MAMMOGRAM FOR MALIGNANT NEOPLASM OF BREAST: ICD-10-CM

## 2022-08-29 PROCEDURE — 77063 BREAST TOMOSYNTHESIS BI: CPT | Performed by: STUDENT IN AN ORGANIZED HEALTH CARE EDUCATION/TRAINING PROGRAM

## 2022-08-29 PROCEDURE — 77067 SCR MAMMO BI INCL CAD: CPT | Performed by: STUDENT IN AN ORGANIZED HEALTH CARE EDUCATION/TRAINING PROGRAM

## 2022-11-21 ENCOUNTER — PATIENT MESSAGE (OUTPATIENT)
Dept: FAMILY MEDICINE CLINIC | Facility: CLINIC | Age: 62
End: 2022-11-21

## 2022-11-21 DIAGNOSIS — E78.00 PURE HYPERCHOLESTEROLEMIA: ICD-10-CM

## 2022-11-23 NOTE — TELEPHONE ENCOUNTER
From: Elizabeth Worley  To: Ian Piedra MD  Sent: 11/21/2022 9:18 AM CST  Subject: Blood work    I just requested a refill for my statin. Does the doctor want me to get blood work before that refill?

## 2022-12-15 NOTE — TELEPHONE ENCOUNTER
Checking up on this refill request around when the pt stated that she would be getting back in town. Labs have still not been completed. Please advise if okay to fill.   Thank you

## 2022-12-20 ENCOUNTER — LAB ENCOUNTER (OUTPATIENT)
Dept: LAB | Age: 62
End: 2022-12-20
Attending: STUDENT IN AN ORGANIZED HEALTH CARE EDUCATION/TRAINING PROGRAM
Payer: COMMERCIAL

## 2022-12-20 DIAGNOSIS — Z00.00 WELLNESS EXAMINATION: ICD-10-CM

## 2022-12-20 DIAGNOSIS — R73.03 PREDIABETES: ICD-10-CM

## 2022-12-20 DIAGNOSIS — E78.00 PURE HYPERCHOLESTEROLEMIA: ICD-10-CM

## 2022-12-20 LAB
CHOLEST SERPL-MCNC: 158 MG/DL (ref ?–200)
EST. AVERAGE GLUCOSE BLD GHB EST-MCNC: 108 MG/DL (ref 68–126)
FASTING PATIENT LIPID ANSWER: YES
HBA1C MFR BLD: 5.4 % (ref ?–5.7)
HDLC SERPL-MCNC: 52 MG/DL (ref 40–59)
LDLC SERPL CALC-MCNC: 89 MG/DL (ref ?–100)
NONHDLC SERPL-MCNC: 106 MG/DL (ref ?–130)
TRIGL SERPL-MCNC: 93 MG/DL (ref 30–149)
VLDLC SERPL CALC-MCNC: 15 MG/DL (ref 0–30)

## 2022-12-20 PROCEDURE — 83036 HEMOGLOBIN GLYCOSYLATED A1C: CPT | Performed by: STUDENT IN AN ORGANIZED HEALTH CARE EDUCATION/TRAINING PROGRAM

## 2022-12-20 PROCEDURE — 80061 LIPID PANEL: CPT | Performed by: STUDENT IN AN ORGANIZED HEALTH CARE EDUCATION/TRAINING PROGRAM

## 2022-12-21 RX ORDER — ROSUVASTATIN CALCIUM 5 MG/1
5 TABLET, COATED ORAL NIGHTLY
Qty: 90 TABLET | Refills: 1 | Status: SHIPPED | OUTPATIENT
Start: 2022-12-21

## 2023-06-19 ENCOUNTER — OFFICE VISIT (OUTPATIENT)
Dept: FAMILY MEDICINE CLINIC | Facility: CLINIC | Age: 63
End: 2023-06-19
Payer: COMMERCIAL

## 2023-06-19 ENCOUNTER — LAB ENCOUNTER (OUTPATIENT)
Dept: LAB | Age: 63
End: 2023-06-19
Attending: STUDENT IN AN ORGANIZED HEALTH CARE EDUCATION/TRAINING PROGRAM
Payer: COMMERCIAL

## 2023-06-19 VITALS
SYSTOLIC BLOOD PRESSURE: 104 MMHG | BODY MASS INDEX: 22.71 KG/M2 | DIASTOLIC BLOOD PRESSURE: 68 MMHG | WEIGHT: 133 LBS | RESPIRATION RATE: 18 BRPM | HEART RATE: 89 BPM | TEMPERATURE: 97 F | HEIGHT: 64 IN

## 2023-06-19 DIAGNOSIS — Z00.00 ROUTINE GENERAL MEDICAL EXAMINATION AT HEALTH CARE FACILITY: Primary | ICD-10-CM

## 2023-06-19 DIAGNOSIS — R73.03 PREDIABETES: ICD-10-CM

## 2023-06-19 DIAGNOSIS — Z00.00 ROUTINE GENERAL MEDICAL EXAMINATION AT HEALTH CARE FACILITY: ICD-10-CM

## 2023-06-19 DIAGNOSIS — Z12.31 VISIT FOR SCREENING MAMMOGRAM: ICD-10-CM

## 2023-06-19 DIAGNOSIS — E78.00 PURE HYPERCHOLESTEROLEMIA: ICD-10-CM

## 2023-06-19 DIAGNOSIS — Z13.820 SCREENING FOR OSTEOPOROSIS: ICD-10-CM

## 2023-06-19 LAB
ALBUMIN SERPL-MCNC: 3.8 G/DL (ref 3.4–5)
ALBUMIN/GLOB SERPL: 0.9 {RATIO} (ref 1–2)
ALP LIVER SERPL-CCNC: 52 U/L
ALT SERPL-CCNC: 36 U/L
ANION GAP SERPL CALC-SCNC: 5 MMOL/L (ref 0–18)
AST SERPL-CCNC: 27 U/L (ref 15–37)
BASOPHILS # BLD AUTO: 0.01 X10(3) UL (ref 0–0.2)
BASOPHILS NFR BLD AUTO: 0.2 %
BILIRUB SERPL-MCNC: 0.4 MG/DL (ref 0.1–2)
BILIRUB UR QL STRIP.AUTO: NEGATIVE
BUN BLD-MCNC: 15 MG/DL (ref 7–18)
CALCIUM BLD-MCNC: 9.2 MG/DL (ref 8.5–10.1)
CHLORIDE SERPL-SCNC: 110 MMOL/L (ref 98–112)
CHOLEST SERPL-MCNC: 141 MG/DL (ref ?–200)
CLARITY UR REFRACT.AUTO: CLEAR
CO2 SERPL-SCNC: 26 MMOL/L (ref 21–32)
CREAT BLD-MCNC: 0.97 MG/DL
EOSINOPHIL # BLD AUTO: 0.11 X10(3) UL (ref 0–0.7)
EOSINOPHIL NFR BLD AUTO: 1.8 %
ERYTHROCYTE [DISTWIDTH] IN BLOOD BY AUTOMATED COUNT: 12.8 %
EST. AVERAGE GLUCOSE BLD GHB EST-MCNC: 123 MG/DL (ref 68–126)
FASTING PATIENT LIPID ANSWER: YES
FASTING STATUS PATIENT QL REPORTED: YES
GFR SERPLBLD BASED ON 1.73 SQ M-ARVRAT: 66 ML/MIN/1.73M2 (ref 60–?)
GLOBULIN PLAS-MCNC: 4.1 G/DL (ref 2.8–4.4)
GLUCOSE BLD-MCNC: 107 MG/DL (ref 70–99)
GLUCOSE UR STRIP.AUTO-MCNC: NEGATIVE MG/DL
HBA1C MFR BLD: 5.9 % (ref ?–5.7)
HCT VFR BLD AUTO: 39.2 %
HDLC SERPL-MCNC: 54 MG/DL (ref 40–59)
HGB BLD-MCNC: 12.5 G/DL
IMM GRANULOCYTES # BLD AUTO: 0.01 X10(3) UL (ref 0–1)
IMM GRANULOCYTES NFR BLD: 0.2 %
KETONES UR STRIP.AUTO-MCNC: NEGATIVE MG/DL
LDLC SERPL CALC-MCNC: 73 MG/DL (ref ?–100)
LEUKOCYTE ESTERASE UR QL STRIP.AUTO: NEGATIVE
LYMPHOCYTES # BLD AUTO: 2.4 X10(3) UL (ref 1–4)
LYMPHOCYTES NFR BLD AUTO: 39.6 %
MCH RBC QN AUTO: 31.1 PG (ref 26–34)
MCHC RBC AUTO-ENTMCNC: 31.9 G/DL (ref 31–37)
MCV RBC AUTO: 97.5 FL
MONOCYTES # BLD AUTO: 0.37 X10(3) UL (ref 0.1–1)
MONOCYTES NFR BLD AUTO: 6.1 %
NEUTROPHILS # BLD AUTO: 3.16 X10 (3) UL (ref 1.5–7.7)
NEUTROPHILS # BLD AUTO: 3.16 X10(3) UL (ref 1.5–7.7)
NEUTROPHILS NFR BLD AUTO: 52.1 %
NITRITE UR QL STRIP.AUTO: NEGATIVE
NONHDLC SERPL-MCNC: 87 MG/DL (ref ?–130)
OSMOLALITY SERPL CALC.SUM OF ELEC: 293 MOSM/KG (ref 275–295)
PH UR STRIP.AUTO: 8 [PH] (ref 5–8)
PLATELET # BLD AUTO: 292 10(3)UL (ref 150–450)
POTASSIUM SERPL-SCNC: 4.4 MMOL/L (ref 3.5–5.1)
PROT SERPL-MCNC: 7.9 G/DL (ref 6.4–8.2)
PROT UR STRIP.AUTO-MCNC: NEGATIVE MG/DL
RBC # BLD AUTO: 4.02 X10(6)UL
RBC UR QL AUTO: NEGATIVE
SODIUM SERPL-SCNC: 141 MMOL/L (ref 136–145)
SP GR UR STRIP.AUTO: 1.01 (ref 1–1.03)
TRIGL SERPL-MCNC: 67 MG/DL (ref 30–149)
TSI SER-ACNC: 3.09 MIU/ML (ref 0.36–3.74)
UROBILINOGEN UR STRIP.AUTO-MCNC: <2 MG/DL
VLDLC SERPL CALC-MCNC: 10 MG/DL (ref 0–30)
WBC # BLD AUTO: 6.1 X10(3) UL (ref 4–11)

## 2023-06-19 PROCEDURE — 83036 HEMOGLOBIN GLYCOSYLATED A1C: CPT

## 2023-06-19 PROCEDURE — 84443 ASSAY THYROID STIM HORMONE: CPT

## 2023-06-19 PROCEDURE — 80053 COMPREHEN METABOLIC PANEL: CPT

## 2023-06-19 PROCEDURE — 99396 PREV VISIT EST AGE 40-64: CPT | Performed by: STUDENT IN AN ORGANIZED HEALTH CARE EDUCATION/TRAINING PROGRAM

## 2023-06-19 PROCEDURE — 3078F DIAST BP <80 MM HG: CPT | Performed by: STUDENT IN AN ORGANIZED HEALTH CARE EDUCATION/TRAINING PROGRAM

## 2023-06-19 PROCEDURE — 81003 URINALYSIS AUTO W/O SCOPE: CPT

## 2023-06-19 PROCEDURE — 3008F BODY MASS INDEX DOCD: CPT | Performed by: STUDENT IN AN ORGANIZED HEALTH CARE EDUCATION/TRAINING PROGRAM

## 2023-06-19 PROCEDURE — 85025 COMPLETE CBC W/AUTO DIFF WBC: CPT

## 2023-06-19 PROCEDURE — 80061 LIPID PANEL: CPT

## 2023-06-19 PROCEDURE — 99213 OFFICE O/P EST LOW 20 MIN: CPT | Performed by: STUDENT IN AN ORGANIZED HEALTH CARE EDUCATION/TRAINING PROGRAM

## 2023-06-19 PROCEDURE — 3074F SYST BP LT 130 MM HG: CPT | Performed by: STUDENT IN AN ORGANIZED HEALTH CARE EDUCATION/TRAINING PROGRAM

## 2023-06-19 RX ORDER — ROSUVASTATIN CALCIUM 5 MG/1
5 TABLET, COATED ORAL NIGHTLY
Qty: 90 TABLET | Refills: 1 | Status: SHIPPED | OUTPATIENT
Start: 2023-06-19

## 2023-06-19 NOTE — PATIENT INSTRUCTIONS
The ultrafast heart scan also called the calcium score is another tool to determine risk for heart disease. It is a low dose CT scan that looks at calcium deposits in the coronary arteries. You can call [phone] or go online [email or web address] to schedule.

## 2023-06-26 ENCOUNTER — HOSPITAL ENCOUNTER (OUTPATIENT)
Dept: BONE DENSITY | Age: 63
Discharge: HOME | End: 2023-06-26
Attending: STUDENT IN AN ORGANIZED HEALTH CARE EDUCATION/TRAINING PROGRAM
Payer: COMMERCIAL

## 2023-06-26 DIAGNOSIS — Z13.820 SCREENING FOR OSTEOPOROSIS: ICD-10-CM

## 2023-06-26 PROCEDURE — 77080 DXA BONE DENSITY AXIAL: CPT | Performed by: STUDENT IN AN ORGANIZED HEALTH CARE EDUCATION/TRAINING PROGRAM

## 2023-06-27 ENCOUNTER — MED REC SCAN ONLY (OUTPATIENT)
Dept: FAMILY MEDICINE CLINIC | Facility: CLINIC | Age: 63
End: 2023-06-27

## 2023-07-10 ENCOUNTER — PATIENT MESSAGE (OUTPATIENT)
Dept: FAMILY MEDICINE CLINIC | Facility: CLINIC | Age: 63
End: 2023-07-10

## 2023-07-10 NOTE — TELEPHONE ENCOUNTER
Probiotics can have varying benefits or adverse effects. The adverse effects can be bloating, diarrhea, nausea. It is difficult for me to recommend a probiotic because there are several types and one that works for one person may not work for another person. The classic is yogurt, but you could also try Activia yogurt, DanActive or eugene which is a yogurt drink. Another would be a probiotic supplement such as Culturelle, Florastor or Align. Probiotics with Lactobacillus can help support vaginal health as well as gut health. It can be some trial and error to find one that works for you. If it is causing adverse effects then it is not worth taking.

## 2023-07-10 NOTE — TELEPHONE ENCOUNTER
From: Heather Worley  To: Virgilio Bernabe MD  Sent: 7/10/2023 1:45 PM CDT  Subject: Probiotics    I forgot to ask at my physical what your thoughts were on probiotics and whether there are any negative side effects if I tried them. Do you recommend any particular type?

## 2023-08-17 DIAGNOSIS — E78.00 PURE HYPERCHOLESTEROLEMIA: ICD-10-CM

## 2023-08-17 RX ORDER — ROSUVASTATIN CALCIUM 5 MG/1
5 TABLET, COATED ORAL NIGHTLY
Qty: 90 TABLET | Refills: 1 | Status: CANCELLED | OUTPATIENT
Start: 2023-08-17

## 2023-08-22 NOTE — TELEPHONE ENCOUNTER
I spoke with Jacki Harris and informed her that per the pharmacist she could  5 days worth of medication if she would like. However on Aug 26th she can  a full refill which will be paid for by insurance. Per the pt she is still on vacation and will have enough to gt her till she comes home. She will  a refill once she is home.

## 2023-08-28 ENCOUNTER — PATIENT MESSAGE (OUTPATIENT)
Dept: FAMILY MEDICINE CLINIC | Facility: CLINIC | Age: 63
End: 2023-08-28

## 2023-09-06 ENCOUNTER — HOSPITAL ENCOUNTER (OUTPATIENT)
Dept: MAMMOGRAPHY | Age: 63
Discharge: HOME | End: 2023-09-06
Attending: STUDENT IN AN ORGANIZED HEALTH CARE EDUCATION/TRAINING PROGRAM
Payer: COMMERCIAL

## 2023-09-06 DIAGNOSIS — Z12.31 VISIT FOR SCREENING MAMMOGRAM: ICD-10-CM

## 2023-09-06 PROCEDURE — 77067 SCR MAMMO BI INCL CAD: CPT | Performed by: STUDENT IN AN ORGANIZED HEALTH CARE EDUCATION/TRAINING PROGRAM

## 2023-09-06 PROCEDURE — 77063 BREAST TOMOSYNTHESIS BI: CPT | Performed by: STUDENT IN AN ORGANIZED HEALTH CARE EDUCATION/TRAINING PROGRAM

## 2023-09-07 ENCOUNTER — PATIENT MESSAGE (OUTPATIENT)
Dept: FAMILY MEDICINE CLINIC | Facility: CLINIC | Age: 63
End: 2023-09-07

## 2023-09-07 NOTE — TELEPHONE ENCOUNTER
From: Merlin Worley  To: Karen John MD  Sent: 9/7/2023 8:03 AM CDT  Subject: Immunization record    Could you please provide me with my immunization record?  I want to make sure I am up to date with tetanus, hepatitis etc.   Thank you

## 2023-11-25 DIAGNOSIS — E78.00 PURE HYPERCHOLESTEROLEMIA: ICD-10-CM

## 2023-11-26 DIAGNOSIS — E78.00 PURE HYPERCHOLESTEROLEMIA: ICD-10-CM

## 2023-11-27 DIAGNOSIS — B00.9 HSV INFECTION: ICD-10-CM

## 2023-11-28 RX ORDER — ROSUVASTATIN CALCIUM 5 MG/1
5 TABLET, COATED ORAL NIGHTLY
Qty: 90 TABLET | Refills: 1 | Status: SHIPPED | OUTPATIENT
Start: 2023-11-28

## 2023-11-28 RX ORDER — ROSUVASTATIN CALCIUM 5 MG/1
5 TABLET, COATED ORAL NIGHTLY
Qty: 90 TABLET | Refills: 1 | OUTPATIENT
Start: 2023-11-28

## 2023-11-28 NOTE — TELEPHONE ENCOUNTER
Requested Prescriptions     Pending Prescriptions Disp Refills    ROSUVASTATIN 5 MG Oral Tab [Pharmacy Med Name: ROSUVASTATIN 5MG TABLETS] 90 tablet 1     Sig: TAKE 1 TABLET(5 MG) BY MOUTH EVERY NIGHT     Last refill #90 x 1 on 6/19/23  LOV 6/19/23  No future appointments. Return in about 1 year (around 6/19/2024) for annual physical or sooner if need arises.   Refilled per protocol

## 2023-11-30 RX ORDER — VALACYCLOVIR HYDROCHLORIDE 1 G/1
2000 TABLET, FILM COATED ORAL EVERY 12 HOURS SCHEDULED
Qty: 12 TABLET | Refills: 0 | Status: SHIPPED | OUTPATIENT
Start: 2023-11-30 | End: 2023-12-01

## 2023-11-30 NOTE — TELEPHONE ENCOUNTER
LOV: 6/19/23  Next OV: Return in about 1 year (around 6/19/2024) for annual physical or sooner if need arises.   Last Refill:6/22/22         Medication Quantity Refills Start End   valACYclovir 1 G Oral Tab 12 tablet 1 6/22/2022 6/23/2022   Sig:   Take 2 tablets (2,000 mg total) by mouth every 12 (twelve) hours for 2 doses.           Please authorize if acceptable.   Thank you!

## 2024-01-03 ENCOUNTER — PATIENT MESSAGE (OUTPATIENT)
Dept: FAMILY MEDICINE CLINIC | Facility: CLINIC | Age: 64
End: 2024-01-03

## 2024-01-03 DIAGNOSIS — E78.00 PURE HYPERCHOLESTEROLEMIA: ICD-10-CM

## 2024-01-03 NOTE — TELEPHONE ENCOUNTER
From: Idalia Worley  To: Chloe Quintana  Sent: 1/3/2024 8:57 AM CST  Subject: Change in prescription insurance coverage    As of January 1, all mandatory maintenance prescriptions must be filled in 90 day supply and through [facility]. I have changed my pharmacy selection on my chart to [facility].   Is it possible for the prescription to be mailed to me through [facility]?

## 2024-01-04 RX ORDER — ROSUVASTATIN CALCIUM 5 MG/1
5 TABLET, COATED ORAL NIGHTLY
Qty: 90 TABLET | Refills: 0 | Status: SHIPPED | OUTPATIENT
Start: 2024-01-04

## 2024-01-04 NOTE — TELEPHONE ENCOUNTER
Last office visit: 6/19/2023   Protocol: pass  Requested medication(s) are due for refill today: yes  Requested medication(s) are on the active medication list same strength, form, dose/ sig: yes  Requested medication(s) are managed by provider: yes  Patient has already received a courtsey refill: no

## 2024-01-25 ENCOUNTER — PATIENT MESSAGE (OUTPATIENT)
Dept: FAMILY MEDICINE CLINIC | Facility: CLINIC | Age: 64
End: 2024-01-25

## 2024-01-25 NOTE — TELEPHONE ENCOUNTER
From: Idalia Worley  To: Chloe Quintana  Sent: 1/25/2024 12:56 PM CST  Subject: Twin Royal vaccine    I received my first vaccine today, January 25 at [facility] in [facility].

## 2024-02-27 ENCOUNTER — PATIENT MESSAGE (OUTPATIENT)
Dept: FAMILY MEDICINE CLINIC | Facility: CLINIC | Age: 64
End: 2024-02-27

## 2024-02-27 NOTE — TELEPHONE ENCOUNTER
From: Idalia Worley  To: Chloe Quintana  Sent: 2/27/2024 10:09 AM CST  Subject: Vaccine update    I received the second dose of the Twinrix vaccine today 2/27/24.   Idalia Worley

## 2024-03-04 ENCOUNTER — PATIENT MESSAGE (OUTPATIENT)
Dept: FAMILY MEDICINE CLINIC | Facility: CLINIC | Age: 64
End: 2024-03-04

## 2024-03-04 NOTE — TELEPHONE ENCOUNTER
From: Idalia Worley  To: Chloe Quintana  Sent: 3/4/2024 11:19 AM CST  Subject: Medication    I have been taking rosuvastatin 5mg. The mail order pharmacy sent rosuvastatin calcium 5 mg. Is that ok? Wanted to confirm it was an appropriate substitute. If it is not, I will need a new script. I am in [place] now.     Thank you   Idalia

## 2024-03-16 DIAGNOSIS — E78.00 PURE HYPERCHOLESTEROLEMIA: ICD-10-CM

## 2024-03-20 RX ORDER — ROSUVASTATIN CALCIUM 5 MG/1
5 TABLET, COATED ORAL NIGHTLY
Qty: 90 TABLET | Refills: 0 | Status: SHIPPED | OUTPATIENT
Start: 2024-03-20

## 2024-03-30 ENCOUNTER — PATIENT MESSAGE (OUTPATIENT)
Dept: FAMILY MEDICINE CLINIC | Facility: CLINIC | Age: 64
End: 2024-03-30

## 2024-04-01 RX ORDER — ATOVAQUONE AND PROGUANIL HYDROCHLORIDE 250; 100 MG/1; MG/1
TABLET, FILM COATED ORAL
COMMUNITY
Start: 2024-03-22

## 2024-04-01 RX ORDER — CIPROFLOXACIN 500 MG/1
TABLET, FILM COATED ORAL
COMMUNITY
Start: 2024-03-22

## 2024-04-01 NOTE — TELEPHONE ENCOUNTER
From: Idalia Worley  To: Chloe Quintana  Sent: 3/30/2024 6:26 AM CDT  Subject: Typhoid oral vaccine    I am just sending the script for the typhoid oral vaccine that I began taking today. Please enter into my chart.   Thank you  Idalia

## 2024-05-07 ENCOUNTER — PATIENT MESSAGE (OUTPATIENT)
Dept: FAMILY MEDICINE CLINIC | Facility: CLINIC | Age: 64
End: 2024-05-07

## 2024-05-07 NOTE — TELEPHONE ENCOUNTER
From: Idalia Worley  To: Chloe GILLESLeanna Quintana  Sent: 5/7/2024 12:26 PM CDT  Subject: Vaccination update    I have received the TDAP and Polio vaccine in preparation for our trip to [facility] and [facility].       Please update my chart.     Thank you!

## 2024-05-17 DIAGNOSIS — E78.00 PURE HYPERCHOLESTEROLEMIA: ICD-10-CM

## 2024-05-17 RX ORDER — ROSUVASTATIN CALCIUM 5 MG/1
5 TABLET, COATED ORAL NIGHTLY
Qty: 90 TABLET | Refills: 0 | Status: SHIPPED | OUTPATIENT
Start: 2024-05-17

## 2024-05-17 NOTE — TELEPHONE ENCOUNTER
Last office visit: 6/19/2023  Last Refill:  3/20/2024  Return To Clinic: 6/19/2024  Protocol: passed  Medication Quantity Refills Start End   rosuvastatin 5 MG Oral Tab 90 tablet 0 3/20/2024 --   Sig:   Take 1 tablet (5 mg total) by mouth nightly.     Route:   Oral

## 2024-08-05 ENCOUNTER — OFFICE VISIT (OUTPATIENT)
Dept: FAMILY MEDICINE CLINIC | Facility: CLINIC | Age: 64
End: 2024-08-05
Payer: MEDICARE

## 2024-08-05 ENCOUNTER — EKG ENCOUNTER (OUTPATIENT)
Dept: LAB | Age: 64
End: 2024-08-05
Attending: STUDENT IN AN ORGANIZED HEALTH CARE EDUCATION/TRAINING PROGRAM
Payer: MEDICARE

## 2024-08-05 VITALS
SYSTOLIC BLOOD PRESSURE: 112 MMHG | DIASTOLIC BLOOD PRESSURE: 70 MMHG | WEIGHT: 135.63 LBS | HEART RATE: 84 BPM | TEMPERATURE: 98 F | RESPIRATION RATE: 16 BRPM | BODY MASS INDEX: 23.73 KG/M2 | HEIGHT: 63.5 IN

## 2024-08-05 DIAGNOSIS — Z13.6 ENCOUNTER FOR SCREENING FOR CARDIOVASCULAR DISORDERS: ICD-10-CM

## 2024-08-05 DIAGNOSIS — Z13.0 SCREENING FOR DEFICIENCY ANEMIA: ICD-10-CM

## 2024-08-05 DIAGNOSIS — R73.03 PREDIABETES: ICD-10-CM

## 2024-08-05 DIAGNOSIS — Z01.419 ENCOUNTER FOR GYNECOLOGICAL EXAMINATION WITHOUT ABNORMAL FINDING: ICD-10-CM

## 2024-08-05 DIAGNOSIS — Z13.29 SCREENING FOR THYROID DISORDER: ICD-10-CM

## 2024-08-05 DIAGNOSIS — Z00.00 MEDICARE ANNUAL WELLNESS VISIT, INITIAL: ICD-10-CM

## 2024-08-05 DIAGNOSIS — Z12.31 ENCOUNTER FOR SCREENING MAMMOGRAM FOR MALIGNANT NEOPLASM OF BREAST: ICD-10-CM

## 2024-08-05 DIAGNOSIS — Z98.890 HISTORY OF MELANOMA EXCISION: ICD-10-CM

## 2024-08-05 DIAGNOSIS — H25.813 COMBINED FORMS OF AGE-RELATED CATARACT, BILATERAL: ICD-10-CM

## 2024-08-05 DIAGNOSIS — Z00.00 MEDICARE ANNUAL WELLNESS VISIT, INITIAL: Primary | ICD-10-CM

## 2024-08-05 DIAGNOSIS — E78.00 PURE HYPERCHOLESTEROLEMIA: ICD-10-CM

## 2024-08-05 DIAGNOSIS — E73.9 LACTOSE INTOLERANCE: ICD-10-CM

## 2024-08-05 DIAGNOSIS — Z85.820 HISTORY OF MELANOMA EXCISION: ICD-10-CM

## 2024-08-05 DIAGNOSIS — Z91.09 ENVIRONMENTAL ALLERGIES: ICD-10-CM

## 2024-08-05 PROBLEM — H25.9 AGE-RELATED CATARACT OF BOTH EYES: Status: ACTIVE | Noted: 2024-08-05

## 2024-08-05 LAB
ALBUMIN SERPL-MCNC: 4.8 G/DL (ref 3.2–4.8)
ALBUMIN/GLOB SERPL: 1.7 {RATIO} (ref 1–2)
ALP LIVER SERPL-CCNC: 55 U/L
ALT SERPL-CCNC: 17 U/L
ANION GAP SERPL CALC-SCNC: 6 MMOL/L (ref 0–18)
AST SERPL-CCNC: 22 U/L (ref ?–34)
ATRIAL RATE: 66 BPM
BASOPHILS # BLD AUTO: 0.03 X10(3) UL (ref 0–0.2)
BASOPHILS NFR BLD AUTO: 0.4 %
BILIRUB SERPL-MCNC: 0.5 MG/DL (ref 0.2–1.1)
BILIRUB UR QL STRIP.AUTO: NEGATIVE
BUN BLD-MCNC: 21 MG/DL (ref 9–23)
CALCIUM BLD-MCNC: 9.9 MG/DL (ref 8.7–10.4)
CHLORIDE SERPL-SCNC: 108 MMOL/L (ref 98–112)
CHOLEST SERPL-MCNC: 160 MG/DL (ref ?–200)
CLARITY UR REFRACT.AUTO: CLEAR
CO2 SERPL-SCNC: 26 MMOL/L (ref 21–32)
CREAT BLD-MCNC: 0.88 MG/DL
EGFRCR SERPLBLD CKD-EPI 2021: 73 ML/MIN/1.73M2 (ref 60–?)
EOSINOPHIL # BLD AUTO: 0.11 X10(3) UL (ref 0–0.7)
EOSINOPHIL NFR BLD AUTO: 1.5 %
ERYTHROCYTE [DISTWIDTH] IN BLOOD BY AUTOMATED COUNT: 12.8 %
EST. AVERAGE GLUCOSE BLD GHB EST-MCNC: 128 MG/DL (ref 68–126)
FASTING PATIENT LIPID ANSWER: YES
FASTING STATUS PATIENT QL REPORTED: YES
GLOBULIN PLAS-MCNC: 2.8 G/DL (ref 2–3.5)
GLUCOSE BLD-MCNC: 97 MG/DL (ref 70–99)
GLUCOSE UR STRIP.AUTO-MCNC: NORMAL MG/DL
HBA1C MFR BLD: 6.1 % (ref ?–5.7)
HCT VFR BLD AUTO: 38 %
HDLC SERPL-MCNC: 44 MG/DL (ref 40–59)
HGB BLD-MCNC: 12.7 G/DL
IMM GRANULOCYTES # BLD AUTO: 0.02 X10(3) UL (ref 0–1)
IMM GRANULOCYTES NFR BLD: 0.3 %
KETONES UR STRIP.AUTO-MCNC: NEGATIVE MG/DL
LDLC SERPL CALC-MCNC: 97 MG/DL (ref ?–100)
LEUKOCYTE ESTERASE UR QL STRIP.AUTO: NEGATIVE
LYMPHOCYTES # BLD AUTO: 3.35 X10(3) UL (ref 1–4)
LYMPHOCYTES NFR BLD AUTO: 46 %
MCH RBC QN AUTO: 31.6 PG (ref 26–34)
MCHC RBC AUTO-ENTMCNC: 33.4 G/DL (ref 31–37)
MCV RBC AUTO: 94.5 FL
MONOCYTES # BLD AUTO: 0.39 X10(3) UL (ref 0.1–1)
MONOCYTES NFR BLD AUTO: 5.4 %
NEUTROPHILS # BLD AUTO: 3.38 X10 (3) UL (ref 1.5–7.7)
NEUTROPHILS # BLD AUTO: 3.38 X10(3) UL (ref 1.5–7.7)
NEUTROPHILS NFR BLD AUTO: 46.4 %
NITRITE UR QL STRIP.AUTO: NEGATIVE
NONHDLC SERPL-MCNC: 116 MG/DL (ref ?–130)
OSMOLALITY SERPL CALC.SUM OF ELEC: 293 MOSM/KG (ref 275–295)
P AXIS: 76 DEGREES
P-R INTERVAL: 168 MS
PH UR STRIP.AUTO: 5 [PH] (ref 5–8)
PLATELET # BLD AUTO: 304 10(3)UL (ref 150–450)
POTASSIUM SERPL-SCNC: 4.3 MMOL/L (ref 3.5–5.1)
PROT SERPL-MCNC: 7.6 G/DL (ref 5.7–8.2)
PROT UR STRIP.AUTO-MCNC: NEGATIVE MG/DL
Q-T INTERVAL: 418 MS
QRS DURATION: 80 MS
QTC CALCULATION (BEZET): 438 MS
R AXIS: 74 DEGREES
RBC # BLD AUTO: 4.02 X10(6)UL
SODIUM SERPL-SCNC: 140 MMOL/L (ref 136–145)
SP GR UR STRIP.AUTO: 1.02 (ref 1–1.03)
T AXIS: 58 DEGREES
TRIGL SERPL-MCNC: 102 MG/DL (ref 30–149)
TSI SER-ACNC: 4.39 MIU/ML (ref 0.55–4.78)
UROBILINOGEN UR STRIP.AUTO-MCNC: NORMAL MG/DL
VENTRICULAR RATE: 66 BPM
VLDLC SERPL CALC-MCNC: 17 MG/DL (ref 0–30)
WBC # BLD AUTO: 7.3 X10(3) UL (ref 4–11)

## 2024-08-05 PROCEDURE — 88175 CYTOPATH C/V AUTO FLUID REDO: CPT | Performed by: STUDENT IN AN ORGANIZED HEALTH CARE EDUCATION/TRAINING PROGRAM

## 2024-08-05 PROCEDURE — 83036 HEMOGLOBIN GLYCOSYLATED A1C: CPT

## 2024-08-05 PROCEDURE — 84443 ASSAY THYROID STIM HORMONE: CPT

## 2024-08-05 PROCEDURE — 85025 COMPLETE CBC W/AUTO DIFF WBC: CPT

## 2024-08-05 PROCEDURE — 93005 ELECTROCARDIOGRAM TRACING: CPT

## 2024-08-05 PROCEDURE — 81001 URINALYSIS AUTO W/SCOPE: CPT

## 2024-08-05 PROCEDURE — 80053 COMPREHEN METABOLIC PANEL: CPT

## 2024-08-05 PROCEDURE — 80061 LIPID PANEL: CPT

## 2024-08-05 PROCEDURE — 93010 ELECTROCARDIOGRAM REPORT: CPT | Performed by: INTERNAL MEDICINE

## 2024-08-05 NOTE — PROGRESS NOTES
Subjective:   Idalia Worley is a 64 year old female who presents for a Medicare Initial Preventative Physical Exam (Welcome to Medicare- < 12 months on Medicare) and scheduled follow up of multiple significant but stable problems.     Med/Surg/Allergy/Fam hx updates: see below  Home: going well - spends time in [place] as well  Work: retired  Activities/Hobbies: golf  Diet: healthy overall, staying hydrated  Exercise: walking, weight  Sleep: good  Mood: good  Habits: social alcohol, no tobacco or drug use  Periods: No LMP recorded. (Menstrual status: Menopause). No PMB.  Sexual activity: denied  Immunizations: due for pneumonia vaccine  Screenings: due for mammogram; has had normal pap smears    Patient had melanoma in April 2024 on left leg by the medial-lateral. Following with dermatology every three months. Just had 3 month check up.     Thinks she may have allergies, some watery eyes, nose irritation and rhinorrhea. Will notice this especially when on the golf course. TMJ acting up as well.     Now lactose intolerant.     Will get last hepatitis B vaccine.     Patient presents for follow up of hyperlipidemia. Patient has been taking medications as prescribed, no muscle aches noted. Working on diet and exercise.   Cholesterol Meds: rosuvastatin Tabs - 5 MG   Cholesterol: 141, done on 6/19/2023.  HDL Cholesterol: 54, done on 6/19/2023.  LDL Cholesterol: 73, done on 6/19/2023.  TriGlycerides 67, done on 6/19/2023.    History/Other:   Fall Risk Assessment:   She has been screened for Falls and is low risk.      Cognitive Assessment:   She had a completely normal cognitive assessment - see flowsheet entries     Functional Ability/Status:   Iadlia Worley has some abnormal functions as listed below:  She has Vision problems based on screening of functional status.     Depression Screening (PHQ):  PHQ-2 SCORE: 0  , done 8/4/2024     Advanced Directives:   She does have a Living Will but we do NOT have it  on file in Epic.    She does have a POA but we do NOT have it on file in Epic.    Discussed Advance Care Planning with patient (and family/surrogate if present). Standard forms made available to patient in After Visit Summary.      Patient Active Problem List   Diagnosis    Plantar fascial fibromatosis    Bunion    Diverticulitis of colon (without mention of hemorrhage)(562.11)    Nummular eczema    Actinic keratosis    Combined forms of age-related cataract, bilateral    Prediabetes    Pure hypercholesterolemia    History of melanoma excision     Allergies:  She is allergic to pcn [penicillins].    Current Medications:  Outpatient Medications Marked as Taking for the 8/5/24 encounter (Office Visit) with Chloe Quintana MD   Medication Sig    ROSUVASTATIN 5 MG Oral Tab TAKE 1 TABLET NIGHTLY    valACYclovir 1 G Oral Tab Take 2 tablets (2,000 mg total) by mouth every 12 (twelve) hours for 2 doses.    Melatonin 1 MG Oral Cap Take 1 capsule (1 mg total) by mouth 4 (four) times a week.    Multiple Vitamins-Minerals (MULTIVITAMIN OR) Take  by mouth.     Medical History:  She  has a past medical history of Actinic keratoses, Acute frontal sinusitis, Acute maxillary sinusitis, Acute serous otitis media, Allergic rhinitis, cause unspecified, Bunion, Diverticulitis of colon (without mention of hemorrhage)(562.11), Diverticulosis, Female stress incontinence, Geographic tongue, HDL deficiency, Herpes simplex without mention of complication, Nasal bone fracture (3/2009), OAB (overactive bladder), Pain in limb, Panic attacks, Plantar fascial fibromatosis, Routine general medical examination at a health care facility, Routine gynecological examination, Screening for diabetes mellitus, Screening for lipoid disorders, Screening for malignant neoplasm of the rectum, Screening for other and unspecified deficiency anemia, Screening for thyroid disorder, and Special screening for malignant neoplasms, colon.  Surgical History:  She   has no past surgical history on file.   Family History:  Her family history includes Asthma in her son; Cancer in her mother and another family member; Dementia in her father; Diabetes in her paternal grandmother; Heart Attack in her father and paternal grandfather; Heart Disease in her paternal grandfather; Hypertension in her maternal grandmother and mother; Neurological Disorder in her father.  Social History:  She  reports that she has never smoked. She has never used smokeless tobacco. She reports current alcohol use. She reports that she does not use drugs.    Tobacco:  She has never smoked tobacco.    CAGE Alcohol Screen:   CAGE screening score of 0 on 8/4/2024, showing low risk of alcohol abuse.      Patient Care Team:  Chloe Quintana MD as PCP - General (Family Medicine)    Review of Systems  GENERAL: feels well otherwise  SKIN: see HPI  EYES: denies blurred vision or double vision  HEENT: denies nasal congestion, sinus pain or ST  LUNGS: denies shortness of breath with exertion  CARDIOVASCULAR: denies chest pain on exertion  GI: denies abdominal pain, denies heartburn  : denies dysuria, vaginal discharge or itching, no complaint of urinary incontinence   MUSCULOSKELETAL: denies back pain  NEURO: denies headaches  PSYCHE: denies depression or anxiety  HEMATOLOGIC: denies hx of anemia  ENDOCRINE: denies thyroid history  ALL/ASTHMA: denies hx of allergy or asthma    Objective:   Physical Exam  General Appearance:  Alert, cooperative, no distress, appears stated age   Head:  Normocephalic, without obvious abnormality, atraumatic   Eyes:  PERRL, conjunctiva/corneas clear, EOM's intact both eyes   Ears:  Normal ear canals, trace air fluid level b/l TM's without erythema or bulging   Nose: Nares normal, septum midline,mucosa pale   Throat: Lips, mucosa, and tongue normal; teeth and gums normal, + posterior rhinorrhea with cobblestoning   Neck: Supple, symmetrical, trachea midline, no adenopathy;   thyroid: not enlarged, symmetric, no tenderness/mass/nodules   Back:   Symmetric, no curvature, ROM normal   Lungs:   Clear to auscultation bilaterally, respirations unlabored   Heart:  Regular rate and rhythm, S1 and S2 normal, no murmur, rub, or gallop   Abdomen:   Soft, non-tender, bowel sounds active all four quadrants,  no masses, no organomegaly   Breast: No dominant masses or suspicious lesions, no nipple discharge   Pelvic: no vulvar lesions, introitus normal, scant discharge, Pap collected, no cervical motion tenderness, no ovarian tenderness   Extremities: Extremities normal, atraumatic, no cyanosis or edema   Pulses: 2+ and symmetric   Skin: Skin color, texture, turgor normal, no rashes or lesions   Lymph nodes: Cervical nodes normal   Neurologic: Normal       /70   Pulse 84   Temp 97.8 °F (36.6 °C) (Temporal)   Resp 16   Ht 5' 3.5\" (1.613 m)   Wt 135 lb 9.6 oz (61.5 kg)   BMI 23.64 kg/m²  Estimated body mass index is 23.64 kg/m² as calculated from the following:    Height as of this encounter: 5' 3.5\" (1.613 m).    Weight as of this encounter: 135 lb 9.6 oz (61.5 kg).    Medicare Hearing Assessment:   Hearing Screening    Time taken: 8/5/2024  6:52 AM  Screening Method: Finger Rub  Finger Rub Result: Pass         Visual Acuity:   Right Eye Visual Acuity: Corrected Right Eye Chart Acuity: 20/30   Left Eye Visual Acuity: Corrected Left Eye Chart Acuity: 20/70   Both Eyes Visual Acuity: Corrected Both Eyes Chart Acuity: 20/30   Able To Tolerate Visual Acuity: No        Assessment & Plan:   Idalia Worley is a 64 year old female who presents for a Medicare Assessment.     1. Medicare annual wellness visit, initial (Primary)  -     EKG 12 Lead; Future; Expected date: 08/05/2024  2. Pure hypercholesterolemia  Patient presents for follow up of HLD  - no side effects of medications  - rosuvastatin 5mg at bedtime  - low fat diet and exercise  - will check labs  -     Comp Metabolic Panel (14);  Future; Expected date: 08/05/2024  -     Lipid Panel; Future; Expected date: 08/05/2024  -     EKG 12 Lead; Future; Expected date: 08/05/2024  -     Urinalysis with Culture Reflex; Future; Expected date: 08/05/2024  3. Prediabetes  Will check A1c, low carb diet and exercise recommended  -     Hemoglobin A1C; Future; Expected date: 08/05/2024  4. Combined forms of age-related cataract, bilateral  Following with ophthalmology, appreciate evaluation and recommendations  5. History of melanoma excision  Continue follow up with dermatology, appreciate evaluation and recommendations  6. Screening for deficiency anemia  -     CBC With Differential With Platelet; Future; Expected date: 08/05/2024  7. Screening for thyroid disorder  -     TSH W Reflex To Free T4; Future; Expected date: 08/05/2024  8. Encounter for screening mammogram for malignant neoplasm of breast  -     [facility] JEREMIAH 2D+3D SCREENING BILAT (CPT=77067/06271); Future; Expected date: 09/06/2024  9. Encounter for screening for cardiovascular disorders  -     EKG 12 Lead; Future; Expected date: 08/05/2024  10. Encounter for gynecological examination without abnormal finding  Due for pap smear  -     Breast and Pelvic Exam, Covered every 2 years []  -     Obtain Pap Smear, Low risk []  (Every 2 Years)  11. Environmental allergies  Can try zyrtec or allegra or xyzal when allergies flare  12. Lactose intolerance  Can use lactaid as needed    The patient indicates understanding of these issues and agrees to the plan.  Reinforced healthy diet, lifestyle, and exercise.      Return in 1 year (on 8/5/2025) for medicare annual wellness visit, or sooner if needed.     Chloe Quintana MD, 8/5/2024     Supplementary Documentation:   General Health:  In the past six months, have you lost more than 10 pounds without trying?: 2 - No  Has your appetite been poor?: No  Type of Diet: Balanced  How does the patient maintain a good energy level?: Daily Walks  How would  you describe your daily physical activity?: Moderate  How would you describe your current health state?: Good  How do you maintain positive mental well-being?: Social Interaction  On a scale of 0 to 10, with 0 being no pain and 10 being severe pain, what is your pain level?: 2 - (Mild)  In the past six months, have you experienced urine leakage?: 0-No  At any time do you feel concerned for the safety/well-being of yourself and/or your children, in your home or elsewhere?: No  Have you had any immunizations at another office such as Influenza, Hepatitis B, Tetanus, or Pneumococcal?: Yes    Health Maintenance   Topic Date Due    COVID-19 Vaccine (4 - 2023-24 season) 09/01/2023    Annual Physical  06/19/2024    Pneumococcal Vaccine: 65+ Years (1 of 1 - PCV) Never done    Mammogram  09/06/2024    Influenza Vaccine (1) 10/01/2024    Pap Smear  08/18/2026    Colorectal Cancer Screening  09/17/2030    DEXA Scan  Completed    Annual Depression Screening  Completed    Fall Risk Screening (Annual)  Completed    Zoster Vaccines  Completed

## 2024-08-06 DIAGNOSIS — R94.31 ABNORMAL EKG: Primary | ICD-10-CM

## 2024-08-10 LAB
.: NORMAL
.: NORMAL

## 2024-08-11 PROBLEM — E73.9 LACTOSE INTOLERANCE: Status: ACTIVE | Noted: 2024-08-11

## 2024-08-11 PROBLEM — Z91.09 ENVIRONMENTAL ALLERGIES: Status: ACTIVE | Noted: 2024-08-11

## 2024-08-13 ENCOUNTER — HOSPITAL ENCOUNTER (OUTPATIENT)
Dept: CV DIAGNOSTICS | Facility: HOSPITAL | Age: 64
Discharge: HOME | End: 2024-08-13
Attending: STUDENT IN AN ORGANIZED HEALTH CARE EDUCATION/TRAINING PROGRAM
Payer: MEDICARE

## 2024-08-13 DIAGNOSIS — R94.31 ABNORMAL EKG: ICD-10-CM

## 2024-08-13 PROCEDURE — 93306 TTE W/DOPPLER COMPLETE: CPT | Performed by: STUDENT IN AN ORGANIZED HEALTH CARE EDUCATION/TRAINING PROGRAM

## 2024-09-06 ENCOUNTER — HOSPITAL ENCOUNTER (OUTPATIENT)
Dept: MAMMOGRAPHY | Age: 64
Discharge: HOME | End: 2024-09-06
Attending: STUDENT IN AN ORGANIZED HEALTH CARE EDUCATION/TRAINING PROGRAM
Payer: MEDICARE

## 2024-09-06 DIAGNOSIS — Z12.31 ENCOUNTER FOR SCREENING MAMMOGRAM FOR MALIGNANT NEOPLASM OF BREAST: ICD-10-CM

## 2024-09-06 PROCEDURE — 77063 BREAST TOMOSYNTHESIS BI: CPT | Performed by: STUDENT IN AN ORGANIZED HEALTH CARE EDUCATION/TRAINING PROGRAM

## 2024-09-06 PROCEDURE — 77067 SCR MAMMO BI INCL CAD: CPT | Performed by: STUDENT IN AN ORGANIZED HEALTH CARE EDUCATION/TRAINING PROGRAM

## 2024-09-28 NOTE — PROGRESS NOTES
Patient presents with:  Physical: with pap       HPI:  No concerns.      Pap Smear,3 Years due on 05/23/2021  Annual Depression Screen due on 06/11/2021  Annual Physical due on 06/11/2021  Mammogram due on 07/30/2021  Influenza Vaccine(1) due on 10/01/2021 a week. • ValACYclovir HCl 1 G Oral Tab Take 2 tablets (2,000 mg total) by mouth every 12 (twelve) hours. (Patient taking differently: Take 2 g by mouth every 12 (twelve) hours.  PRN ) 12 tablet 1   • Multiple Vitamins-Minerals (MULTIVITAMIN OR) Take  b BMI 24.14 kg/m²   GENERAL: well developed, well nourished, in no apparent distress. EARS: Bilateral pinna / tragus are WNL in appearance, External canals patent and without inflammation. Bilateral tympanic membranes pearly white. No effusions noted.  Hea 30 days as well as to make all appointments for referrals if given within the next 30 days. Patient understands to contact office if unable to do so.     Return to Office: 1 year 309.9

## 2024-11-08 DIAGNOSIS — E78.00 PURE HYPERCHOLESTEROLEMIA: ICD-10-CM

## 2024-11-10 ENCOUNTER — PATIENT MESSAGE (OUTPATIENT)
Dept: FAMILY MEDICINE CLINIC | Facility: CLINIC | Age: 64
End: 2024-11-10

## 2024-11-11 RX ORDER — ROSUVASTATIN CALCIUM 5 MG/1
5 TABLET, COATED ORAL NIGHTLY
Qty: 90 TABLET | Refills: 0 | Status: SHIPPED | OUTPATIENT
Start: 2024-11-11

## 2025-01-30 DIAGNOSIS — E78.00 PURE HYPERCHOLESTEROLEMIA: ICD-10-CM

## 2025-01-31 RX ORDER — ROSUVASTATIN CALCIUM 5 MG/1
5 TABLET, COATED ORAL NIGHTLY
Qty: 90 TABLET | Refills: 0 | Status: SHIPPED | OUTPATIENT
Start: 2025-01-31

## 2025-01-31 NOTE — TELEPHONE ENCOUNTER
Last Office Visit: 08/05/2024    Last Refill:   Medication Quantity Refills Start End   rosuvastatin 5 MG Oral Tab 90 tablet 0 11/11/2024 --     Return to Clinic: 08/05/2025    Protocol: passed

## 2025-02-04 DIAGNOSIS — E78.00 PURE HYPERCHOLESTEROLEMIA: ICD-10-CM

## 2025-02-06 ENCOUNTER — PATIENT MESSAGE (OUTPATIENT)
Dept: FAMILY MEDICINE CLINIC | Facility: CLINIC | Age: 65
End: 2025-02-06

## 2025-02-06 RX ORDER — ROSUVASTATIN CALCIUM 5 MG/1
5 TABLET, COATED ORAL NIGHTLY
Qty: 90 TABLET | Refills: 0 | OUTPATIENT
Start: 2025-02-06

## 2025-02-07 NOTE — TELEPHONE ENCOUNTER
LOV:08/05/2024      NOV:due in 08/2025    Medication: rosuvastatin 5 mg 1 daily # 90 was refilled on 01/31/2025( [facility] in [place] )

## 2025-02-18 DIAGNOSIS — E78.00 PURE HYPERCHOLESTEROLEMIA: ICD-10-CM

## 2025-02-20 RX ORDER — ROSUVASTATIN CALCIUM 5 MG/1
5 TABLET, COATED ORAL NIGHTLY
Qty: 90 TABLET | Refills: 0 | OUTPATIENT
Start: 2025-02-20

## 2025-05-05 DIAGNOSIS — E78.00 PURE HYPERCHOLESTEROLEMIA: ICD-10-CM

## 2025-05-07 DIAGNOSIS — E78.00 PURE HYPERCHOLESTEROLEMIA: ICD-10-CM

## 2025-05-07 RX ORDER — ROSUVASTATIN CALCIUM 5 MG/1
5 TABLET, COATED ORAL NIGHTLY
Qty: 90 TABLET | Refills: 0 | Status: SHIPPED | OUTPATIENT
Start: 2025-05-07 | End: 2025-05-07

## 2025-05-07 NOTE — TELEPHONE ENCOUNTER
Requested Prescriptions     Pending Prescriptions Disp Refills    ROSUVASTATIN 5 MG Oral Tab [Pharmacy Med Name: ROSUVASTATIN CALCIUM 5 MG TAB] 90 tablet 0     Sig: TAKE 1 TABLET BY MOUTH EVERY DAY AT NIGHT     Last refill 1/31/25 #90  LOV 8/5/24  Future Appointments   Date Time Provider Department Center   9/18/2025  7:30 AM Chloe Quintana MD [id number]     Refilled per protocol

## 2025-05-08 RX ORDER — ROSUVASTATIN CALCIUM 5 MG/1
5 TABLET, COATED ORAL NIGHTLY
Qty: 90 TABLET | Refills: 0 | Status: SHIPPED | OUTPATIENT
Start: 2025-05-08

## 2025-05-08 NOTE — TELEPHONE ENCOUNTER
Requested Prescriptions     Pending Prescriptions Disp Refills    rosuvastatin 5 MG Oral Tab 90 tablet 0     Sig: Take 1 tablet (5 mg total) by mouth nightly.     Refill sent to wrong pharmacy.  Refill sent to correct pharmacy.

## 2025-05-13 DIAGNOSIS — E78.00 PURE HYPERCHOLESTEROLEMIA: ICD-10-CM

## 2025-05-13 RX ORDER — ROSUVASTATIN CALCIUM 5 MG/1
5 TABLET, COATED ORAL NIGHTLY
Qty: 90 TABLET | Refills: 0 | Status: CANCELLED | OUTPATIENT
Start: 2025-05-13

## 2025-05-17 ENCOUNTER — PATIENT MESSAGE (OUTPATIENT)
Dept: FAMILY MEDICINE CLINIC | Facility: CLINIC | Age: 65
End: 2025-05-17

## 2025-08-11 ENCOUNTER — OFFICE VISIT (OUTPATIENT)
Dept: FAMILY MEDICINE CLINIC | Facility: CLINIC | Age: 65
End: 2025-08-11

## 2025-08-11 VITALS — WEIGHT: 133.5 LBS | BODY MASS INDEX: 23.36 KG/M2 | HEIGHT: 63.5 IN | RESPIRATION RATE: 16 BRPM

## 2025-08-11 DIAGNOSIS — B00.9 HSV INFECTION: ICD-10-CM

## 2025-08-11 DIAGNOSIS — Z98.890 HISTORY OF MELANOMA EXCISION: ICD-10-CM

## 2025-08-11 DIAGNOSIS — Z12.31 ENCOUNTER FOR SCREENING MAMMOGRAM FOR MALIGNANT NEOPLASM OF BREAST: ICD-10-CM

## 2025-08-11 DIAGNOSIS — R73.03 PREDIABETES: ICD-10-CM

## 2025-08-11 DIAGNOSIS — Z00.00 ENCOUNTER FOR ANNUAL WELLNESS VISIT: Primary | ICD-10-CM

## 2025-08-11 DIAGNOSIS — Z85.820 HISTORY OF MELANOMA EXCISION: ICD-10-CM

## 2025-08-11 DIAGNOSIS — E78.00 PURE HYPERCHOLESTEROLEMIA: ICD-10-CM

## 2025-08-11 RX ORDER — VALACYCLOVIR HYDROCHLORIDE 1 G/1
2000 TABLET, FILM COATED ORAL EVERY 12 HOURS SCHEDULED
Qty: 12 TABLET | Refills: 0 | Status: SHIPPED | OUTPATIENT
Start: 2025-08-11 | End: 2025-08-12

## 2025-08-13 ENCOUNTER — MED REC SCAN ONLY (OUTPATIENT)
Dept: FAMILY MEDICINE CLINIC | Facility: CLINIC | Age: 65
End: 2025-08-13

## 2025-08-14 DIAGNOSIS — E78.00 PURE HYPERCHOLESTEROLEMIA: ICD-10-CM

## 2025-08-15 ENCOUNTER — LAB ENCOUNTER (OUTPATIENT)
Dept: LAB | Age: 65
End: 2025-08-15
Attending: FAMILY MEDICINE

## 2025-08-15 DIAGNOSIS — R73.03 PREDIABETES: ICD-10-CM

## 2025-08-15 DIAGNOSIS — E78.00 PURE HYPERCHOLESTEROLEMIA: ICD-10-CM

## 2025-08-15 DIAGNOSIS — Z98.890 HISTORY OF MELANOMA EXCISION: ICD-10-CM

## 2025-08-15 DIAGNOSIS — Z85.820 HISTORY OF MELANOMA EXCISION: ICD-10-CM

## 2025-08-15 LAB
ALBUMIN SERPL-MCNC: 4.7 G/DL (ref 3.2–4.8)
ALBUMIN/GLOB SERPL: 1.7 (ref 1–2)
ALP LIVER SERPL-CCNC: 51 U/L (ref 55–142)
ALT SERPL-CCNC: 19 U/L (ref 10–49)
ANION GAP SERPL CALC-SCNC: 9 MMOL/L (ref 0–18)
AST SERPL-CCNC: 24 U/L (ref ?–34)
BASOPHILS # BLD AUTO: 0.02 X10(3) UL (ref 0–0.2)
BASOPHILS NFR BLD AUTO: 0.2 %
BILIRUB SERPL-MCNC: 0.4 MG/DL (ref 0.2–1.1)
BUN BLD-MCNC: 13 MG/DL (ref 9–23)
CALCIUM BLD-MCNC: 9.8 MG/DL (ref 8.7–10.6)
CHLORIDE SERPL-SCNC: 107 MMOL/L (ref 98–112)
CHOLEST SERPL-MCNC: 164 MG/DL (ref ?–200)
CO2 SERPL-SCNC: 26 MMOL/L (ref 21–32)
CREAT BLD-MCNC: 0.95 MG/DL (ref 0.55–1.02)
EGFRCR SERPLBLD CKD-EPI 2021: 66 ML/MIN/1.73M2 (ref 60–?)
EOSINOPHIL # BLD AUTO: 0.19 X10(3) UL (ref 0–0.7)
EOSINOPHIL NFR BLD AUTO: 1.7 %
ERYTHROCYTE [DISTWIDTH] IN BLOOD BY AUTOMATED COUNT: 13.1 %
EST. AVERAGE GLUCOSE BLD GHB EST-MCNC: 123 MG/DL (ref 68–126)
FASTING PATIENT LIPID ANSWER: YES
FASTING STATUS PATIENT QL REPORTED: YES
GLOBULIN PLAS-MCNC: 2.8 G/DL (ref 2–3.5)
GLUCOSE BLD-MCNC: 103 MG/DL (ref 70–99)
HBA1C MFR BLD: 5.9 % (ref ?–5.7)
HCT VFR BLD AUTO: 38.3 % (ref 35–48)
HDLC SERPL-MCNC: 49 MG/DL (ref 40–59)
HGB BLD-MCNC: 12.5 G/DL (ref 12–16)
IMM GRANULOCYTES # BLD AUTO: 0.03 X10(3) UL (ref 0–1)
IMM GRANULOCYTES NFR BLD: 0.3 %
LDLC SERPL CALC-MCNC: 98 MG/DL (ref ?–100)
LYMPHOCYTES # BLD AUTO: 2.7 X10(3) UL (ref 1–4)
LYMPHOCYTES NFR BLD AUTO: 24.3 %
MCH RBC QN AUTO: 31.2 PG (ref 26–34)
MCHC RBC AUTO-ENTMCNC: 32.6 G/DL (ref 31–37)
MCV RBC AUTO: 95.5 FL (ref 80–100)
MONOCYTES # BLD AUTO: 0.55 X10(3) UL (ref 0.1–1)
MONOCYTES NFR BLD AUTO: 4.9 %
NEUTROPHILS # BLD AUTO: 7.64 X10 (3) UL (ref 1.5–7.7)
NEUTROPHILS # BLD AUTO: 7.64 X10(3) UL (ref 1.5–7.7)
NEUTROPHILS NFR BLD AUTO: 68.6 %
NONHDLC SERPL-MCNC: 115 MG/DL (ref ?–130)
OSMOLALITY SERPL CALC.SUM OF ELEC: 294 MOSM/KG (ref 275–295)
PLATELET # BLD AUTO: 340 10(3)UL (ref 150–450)
POTASSIUM SERPL-SCNC: 4.8 MMOL/L (ref 3.5–5.1)
PROT SERPL-MCNC: 7.5 G/DL (ref 5.7–8.2)
RBC # BLD AUTO: 4.01 X10(6)UL (ref 3.8–5.3)
SODIUM SERPL-SCNC: 142 MMOL/L (ref 136–145)
TRIGL SERPL-MCNC: 93 MG/DL (ref 30–149)
TSI SER-ACNC: 2.87 UIU/ML (ref 0.55–4.78)
VLDLC SERPL CALC-MCNC: 15 MG/DL (ref 0–30)
WBC # BLD AUTO: 11.1 X10(3) UL (ref 4–11)

## 2025-08-15 PROCEDURE — 80053 COMPREHEN METABOLIC PANEL: CPT

## 2025-08-15 PROCEDURE — 85025 COMPLETE CBC W/AUTO DIFF WBC: CPT

## 2025-08-15 PROCEDURE — 84443 ASSAY THYROID STIM HORMONE: CPT

## 2025-08-15 PROCEDURE — 36415 COLL VENOUS BLD VENIPUNCTURE: CPT

## 2025-08-15 PROCEDURE — 83036 HEMOGLOBIN GLYCOSYLATED A1C: CPT

## 2025-08-15 PROCEDURE — 80061 LIPID PANEL: CPT

## 2025-08-18 ENCOUNTER — PATIENT MESSAGE (OUTPATIENT)
Dept: FAMILY MEDICINE CLINIC | Facility: CLINIC | Age: 65
End: 2025-08-18

## 2025-08-19 DIAGNOSIS — E78.00 PURE HYPERCHOLESTEROLEMIA: ICD-10-CM

## 2025-08-19 RX ORDER — ROSUVASTATIN CALCIUM 5 MG/1
5 TABLET, COATED ORAL NIGHTLY
Qty: 30 TABLET | Refills: 0 | Status: SHIPPED | OUTPATIENT
Start: 2025-08-19 | End: 2025-08-22

## 2025-08-22 RX ORDER — ROSUVASTATIN CALCIUM 5 MG/1
5 TABLET, COATED ORAL NIGHTLY
Qty: 90 TABLET | Refills: 1 | Status: SHIPPED | OUTPATIENT
Start: 2025-08-22

## 2025-08-31 ENCOUNTER — OFFICE VISIT (OUTPATIENT)
Dept: FAMILY MEDICINE CLINIC | Facility: CLINIC | Age: 65
End: 2025-08-31

## 2025-08-31 VITALS
DIASTOLIC BLOOD PRESSURE: 72 MMHG | OXYGEN SATURATION: 99 % | WEIGHT: 133 LBS | HEART RATE: 63 BPM | HEIGHT: 63.5 IN | SYSTOLIC BLOOD PRESSURE: 114 MMHG | TEMPERATURE: 98 F | RESPIRATION RATE: 18 BRPM | BODY MASS INDEX: 23.27 KG/M2

## 2025-08-31 DIAGNOSIS — J06.9 VIRAL UPPER RESPIRATORY TRACT INFECTION: Primary | ICD-10-CM

## 2025-08-31 PROCEDURE — 99213 OFFICE O/P EST LOW 20 MIN: CPT | Performed by: NURSE PRACTITIONER

## (undated) NOTE — MR AVS SNAPSHOT
[facility] [address] [place] [id number]  [phone]               Thank you for choosing us for your health care visit with Nilsa Melgar MD.  We are glad to serve you and happy to provide you with this year. Labs ordered: lipid profile, comprehensive metabolic profile, CBC, TSH. We recommend exercise 150 minutes per week, combination of aerobic, strength and flexibility.  Diet: recommend low fat, high fiber with whole grains, fruits, vegetables, lean meat discharge instructions in [email or web address] by going to Visits < Admission Summaries. If you've been to the Emergency Department or your doctor's office, you can view your past visit information in [email or web address] by going to Visits < Visit Summaries. [employer] questions?

## (undated) NOTE — MR AVS SNAPSHOT
After Visit Summary   8/18/2021    Sobia Worley    MRN: [id number]           Visit Information     Date & Time  8/18/2021  2:30 PM Provider  [facility]  [place] [address], [facility]  Phone  63 cough, fever, rash, sore throat, headache and pink eye. The cost for a Video Visit is currently $35.         If you receive a survey from "[employer] please take a few minutes to complete it and provide feedback.  We strive to deliver the best patient exp non-life-threatening. Also available by appointment Average cost  $120*     EMERGENCY ROOM Life-threatening emergencies needing immediate intervention at a hospital emergency room.  Average cost  $2,300*   *Cost varies based on your insurance coverage  F

## (undated) NOTE — LETTER
07/02/19        Sobia Jaimes [id number]      Dear Maco Taylor,    [id number] records indicate that you have outstanding lab work and or testing that was ordered for you and has not yet been completed:  Orders Placed This Encoun